# Patient Record
Sex: FEMALE | Race: WHITE | Employment: OTHER | URBAN - METROPOLITAN AREA
[De-identification: names, ages, dates, MRNs, and addresses within clinical notes are randomized per-mention and may not be internally consistent; named-entity substitution may affect disease eponyms.]

---

## 2017-01-04 ENCOUNTER — OFFICE VISIT (OUTPATIENT)
Dept: FAMILY MEDICINE CLINIC | Age: 77
End: 2017-01-04

## 2017-01-04 VITALS
BODY MASS INDEX: 24.16 KG/M2 | HEART RATE: 79 BPM | SYSTOLIC BLOOD PRESSURE: 136 MMHG | TEMPERATURE: 97.8 F | DIASTOLIC BLOOD PRESSURE: 77 MMHG | HEIGHT: 65 IN | RESPIRATION RATE: 18 BRPM | WEIGHT: 145 LBS | OXYGEN SATURATION: 95 %

## 2017-01-04 DIAGNOSIS — F41.9 ANXIETY: Primary | Chronic | ICD-10-CM

## 2017-01-04 RX ORDER — MELATONIN
DAILY
COMMUNITY

## 2017-01-04 RX ORDER — DIAZEPAM 5 MG/1
TABLET ORAL
Qty: 60 TAB | Refills: 2 | Status: SHIPPED | OUTPATIENT
Start: 2017-01-04 | End: 2017-03-27 | Stop reason: SDUPTHER

## 2017-01-04 NOTE — PROGRESS NOTES
Plans to see card later this month for overdue routine f/u. Nurse history read and confirmed by patient. [address] printout reviewed. Visit Vitals    /77    Pulse 79    Temp 97.8 °F (36.6 °C)    Resp 18    Ht 5' 5\" (1.651 m)    Wt 145 lb (65.8 kg)    SpO2 95%    BMI 24.13 kg/m2       Patient alert and cooperative. Doing well on current med at current doses, no need to increase or decrease amount. Assessment:  1. Chronic anxiety, on chronic low dose Valium. Plan:  1. Refilled at three months. Return at that time for nurse visit, annual labs, and visit due in August.  2. Follow otherwise here prn.

## 2017-01-04 NOTE — MR AVS SNAPSHOT
Visit Information Date & Time Provider Department Dept. Phone Encounter #  
 1/4/2017  3:30 PM Andra Reagan MD [facility] [phone] [id number] Follow-up Instructions Return in about 3 months (around 4/4/2017) for NV refill. Upcoming Health Maintenance Date Due  
 GLAUCOMA SCREENING Q2Y 1/6/2017* DTaP/Tdap/Td series (1 - Tdap) 1/6/2017* MEDICARE YEARLY EXAM 1/20/2017 BREAST CANCER SCRN MAMMOGRAM 6/29/2017 Pneumococcal 65+ Low/Medium Risk (2 of 2 - PPSV23) 7/6/2017 COLONOSCOPY 11/21/2018 *Topic was postponed. The date shown is not the original due date. Allergies as of 1/4/2017  Review Complete On: 1/4/2017 By: Leni House LPN Severity Noted Reaction Type Reactions Ciprofloxacin  01/20/2016   Side Effect Nausea Only Current Immunizations  Reviewed on 7/9/2014 No immunizations on file. Not reviewed this visit You Were Diagnosed With   
  
 Codes Comments Anxiety    -  Primary ICD-10-CM: F41.9 ICD-9-CM: 300.00 Vitals BP Pulse Temp Resp Height(growth percentile) Weight(growth percentile) 136/77 79 97.8 °F (36.6 °C) 18 5' 5\" (1.651 m) 145 lb (65.8 kg) SpO2 BMI OB Status Smoking Status 95% 24.13 kg/m2 Having regular periods Former Smoker BMI and BSA Data Body Mass Index Body Surface Area  
 24.13 kg/m 2 1.74 m 2 Preferred Pharmacy Pharmacy Name Phone [facility] [address] AT [address] [place] [address] Drive [phone] Your Updated Medication List  
  
   
This list is accurate as of: 1/4/17  4:05 PM.  Always use your most recent med list.  
  
  
  
  
 aspirin delayed-release 81 mg tablet Take 81 mg by mouth daily. diazePAM 5 mg tablet Commonly known as:  VALIUM Take full tablet in the am and 1/2 as needed mid day and at bedtime for anxiety  Indications: ANXIETY  
  
 naproxen sodium 220 mg tablet Commonly known as:  NAPROSYN Take 220 mg by mouth daily as needed. simvastatin 20 mg tablet Commonly known as:  ZOCOR Take 1 Tab by mouth nightly. Indications: HYPERCHOLESTEROLEMIA  
  
 VITAMIN D3 1,000 unit tablet Generic drug:  cholecalciferol Take  by mouth daily. Prescriptions Printed Refills  
 diazePAM (VALIUM) 5 mg tablet 2 Sig: Take full tablet in the am and 1/2 as needed mid day and at bedtime for anxiety  Indications: ANXIETY Class: Print Follow-up Instructions Return in about 3 months (around 4/4/2017) for NV refill. Introducing [facility]! Dear Patricia Lockhart: Thank you for requesting a Pango account. Our records indicate that you already have an active Pango account. You can access your account anytime at [email or web address]. [email or web address] Did you know that you can access your hospital and ER discharge instructions at any time in Pango? You can also review all of your test results from your hospital stay or ER visit. Additional Information If you have questions, please visit the Frequently Asked Questions section of the Pango website at [email or web address] Remember, Pango is NOT to be used for urgent needs. For medical emergencies, dial 911. Now available from your iPhone and Android! Please provide this summary of care documentation to your next provider. Your primary care clinician is listed as [phone] If you have any questions after today's visit, please call [phone].

## 2017-01-04 NOTE — PROGRESS NOTES
Here for MD visit to have her Valium refilled. New control agreement done. She has not seen any other providers since last visit.   Right to Decide booklet given at last visit

## 2017-02-25 ENCOUNTER — HOSPITAL ENCOUNTER (OUTPATIENT)
Dept: LAB | Age: 77
Discharge: HOME | End: 2017-02-25

## 2017-02-25 ENCOUNTER — HOSPITAL ENCOUNTER (EMERGENCY)
Age: 77
Discharge: HOME | End: 2017-02-25
Attending: EMERGENCY MEDICINE

## 2017-02-25 VITALS
DIASTOLIC BLOOD PRESSURE: 81 MMHG | OXYGEN SATURATION: 95 % | WEIGHT: 144.7 LBS | HEART RATE: 86 BPM | RESPIRATION RATE: 16 BRPM | TEMPERATURE: 97.5 F | BODY MASS INDEX: 24.11 KG/M2 | SYSTOLIC BLOOD PRESSURE: 173 MMHG | HEIGHT: 65 IN

## 2017-02-25 DIAGNOSIS — B34.9 VIRAL ILLNESS: Primary | ICD-10-CM

## 2017-02-25 LAB
BILIRUB UR QL: NEGATIVE
GLUCOSE UR QL STRIP.AUTO: NEGATIVE MG/DL
KETONES UR-MCNC: NEGATIVE MG/DL
LEUKOCYTE ESTERASE UR QL STRIP: NEGATIVE
NITRITE UR QL: NEGATIVE
PH UR: 7 [PH] (ref 5–8)
PROT UR QL: NEGATIVE MG/DL
RBC # UR STRIP: NEGATIVE /UL
SP GR UR: 1.01 (ref 1–1.03)
UROBILINOGEN UR QL: 0.2 EU/DL (ref 0.2–1)

## 2017-02-25 PROCEDURE — 87086 URINE CULTURE/COLONY COUNT: CPT | Performed by: EMERGENCY MEDICINE

## 2017-02-25 NOTE — DISCHARGE INSTRUCTIONS

## 2017-02-25 NOTE — UC PROVIDER NOTE
Patient is a 76 y.o. female presenting with general illness. The history is provided by the patient. Generalized Body Aches   This is a new problem. The current episode started more than 2 days ago. The problem occurs constantly. The problem has not changed since onset. Pertinent negatives include no chest pain, no abdominal pain, no headaches and no shortness of breath. Nothing aggravates the symptoms. Nothing relieves the symptoms. She has tried acetaminophen and aspirin for the symptoms. The treatment provided no relief. Past Medical History:   Diagnosis Date    Advance directive discussed with patient 03/23/2016    Dysuria 5/9/16    [facility]  US report rec'd     Encounter for gynecological examination 5/19/16    notes from [address]    Hearing loss in left ear     Murmur, cardiac     echocardiogram report from Dr José Antonio Paul  EF 65-70%  1/23/17 note and ekg    Myalgia     Psychiatric disorder     anxiety    Pure hypercholesterolemia 11/14/2009    Unexplained weight loss 6/6/16    GI spec Dr Anthony Tavarez note rec'd        Past Surgical History:   Procedure Laterality Date    ENDOSCOPY, COLON, DIAGNOSTIC  2/2005    ad polyp; 3 year repeat ; [facility] ENDOSCOPY, COLON, DIAGNOSTIC  3/2008    neg; 5 year repeat; [facility] HX GYN      tubes tied    [place] COLONOSCOPY FLX DX W/COLLJ SPEC WHEN PFRMD  11/21/2013; 2008; 2003              Family History   Problem Relation Age of Onset    Dementia Mother     Other Mother      Vitamin B12 deficiency/Pernicious anemia    Congenital heart defect Father     Cancer Brother      Prostate CA    Stroke Brother     Cancer Daughter      Non-Hodgkin's lymphoma        Social History     Social History    Marital status:      Spouse name: N/A    Number of children: N/A    Years of education: N/A     Occupational History    Not on file.      Social History Main Topics    Smoking status: Former Smoker     Packs/day: 0.50     Years: 20.00     Types: Cigarettes     Quit date: 12/4/1973    Smokeless tobacco: Never Used    Alcohol use Yes      Comment: very little (2-3x/month)    Drug use: No    Sexual activity: Yes     Partners: Male     Other Topics Concern    Not on file     Social History Narrative                ALLERGIES: Ciprofloxacin    Review of Systems   Constitutional: Positive for fatigue. Negative for chills and fever. HENT: Negative for facial swelling, mouth sores, postnasal drip, rhinorrhea and trouble swallowing. Eyes: Negative. Respiratory: Negative for cough, chest tightness and shortness of breath. Cardiovascular: Negative for chest pain. Gastrointestinal: Positive for nausea. Negative for abdominal pain and diarrhea. Endocrine: Negative. Genitourinary: Negative. Musculoskeletal: Positive for myalgias. Negative for back pain and neck pain. Skin: Negative. Neurological: Negative for headaches. All other systems reviewed and are negative. Vitals:    02/25/17 1519   BP: 173/81   Pulse: 86   Resp: 16   Temp: 97.5 °F (36.4 °C)   SpO2: 95%   Weight: 65.6 kg (144 lb 11.2 oz)   Height: 5' 5\" (1.651 m)       Physical Exam   Constitutional: She is oriented to person, place, and time. She appears well-developed and well-nourished. HENT:   Head: Normocephalic and atraumatic. Mouth/Throat: Oropharynx is clear and moist. No oropharyngeal exudate. Eyes: Conjunctivae and EOM are normal. Pupils are equal, round, and reactive to light. Right eye exhibits no discharge. Left eye exhibits no discharge. No scleral icterus. Neck: Normal range of motion. No tracheal deviation present. No thyromegaly present. Cardiovascular: Normal rate, regular rhythm and normal heart sounds. No murmur heard. Pulmonary/Chest: Effort normal and breath sounds normal. No respiratory distress. She has no wheezes. She has no rales. She exhibits no tenderness. Abdominal: Soft. Bowel sounds are normal. She exhibits no distension.  There is no tenderness. There is no rebound and no guarding. Musculoskeletal: Normal range of motion. She exhibits no edema or tenderness. Lymphadenopathy:     She has no cervical adenopathy. Neurological: She is alert and oriented to person, place, and time. No cranial nerve deficit. Coordination normal.   Skin: Skin is warm. No erythema. Psychiatric: She has a normal mood and affect. Her behavior is normal. Judgment and thought content normal.   Nursing note and vitals reviewed. MDM     Differential Diagnosis; Clinical Impression; Plan:      Body aches, fatigue, likely viral syndrome, will send urine culture, given patient's prior history of uti's, follow up pcp      Procedures

## 2017-02-27 ENCOUNTER — PATIENT OUTREACH (OUTPATIENT)
Dept: FAMILY MEDICINE CLINIC | Age: 77
End: 2017-02-27

## 2017-02-27 LAB
BACTERIA SPEC CULT: NORMAL
CC UR VC: NORMAL
SERVICE CMNT-IMP: NORMAL

## 2017-02-27 NOTE — PROGRESS NOTES
NNTOCED    Pt listed on 17 Hospital Discharge Report [facility]). Seen @ [facility]. Diagnosis:  Viral Syndrome    Pt hx of UTIs. POC URINE MACROSCOPIC - negative  - Urine Culture done-  [place] Count <10,000   COLONIES/mL        Final   Culture result: NO SIGNIFICANT GROWTH           Discharge Plan/Instructions:  - disposition-home  -no Rxs  - PCP f/u if sxs worsen    12:22 PM- pt outreach (call). Pt ID verified with 2 identifiers, name and . NN introduction. Reason for call stated. Pt reported \"feeling a little better\"  - Felt sickly. Wanted to r/o UTI. It was r/o  - No fever. Still a little achy & weak. Drinking plenty of fluids  - PCP f/u offered. Pt declined. \"It's viral & has to run it's course\"  - NN contact # given. Pt instructed to call if not improved within next 2-3- days.  Pt verbalized understanding  -

## 2017-03-27 ENCOUNTER — OFFICE VISIT (OUTPATIENT)
Dept: FAMILY MEDICINE CLINIC | Age: 77
End: 2017-03-27

## 2017-03-27 VITALS
HEART RATE: 71 BPM | HEIGHT: 65 IN | OXYGEN SATURATION: 93 % | BODY MASS INDEX: 24.71 KG/M2 | TEMPERATURE: 96.1 F | WEIGHT: 148.3 LBS | RESPIRATION RATE: 16 BRPM | SYSTOLIC BLOOD PRESSURE: 131 MMHG | DIASTOLIC BLOOD PRESSURE: 70 MMHG

## 2017-03-27 DIAGNOSIS — F41.9 ANXIETY: Chronic | ICD-10-CM

## 2017-03-27 RX ORDER — LANOLIN ALCOHOL/MO/W.PET/CERES
400 CREAM (GRAM) TOPICAL DAILY
COMMUNITY
End: 2022-08-19

## 2017-03-27 RX ORDER — DIAZEPAM 5 MG/1
TABLET ORAL
Qty: 60 TAB | Refills: 2 | Status: SHIPPED | OUTPATIENT
Start: 2017-03-27 | End: 2017-05-23 | Stop reason: SDUPTHER

## 2017-03-27 RX ORDER — MAG HYDROX/ALUMINUM HYD/SIMETH 200-200-20
SUSPENSION, ORAL (FINAL DOSE FORM) ORAL AS NEEDED
COMMUNITY
End: 2017-05-23 | Stop reason: ALTCHOICE

## 2017-03-27 NOTE — PROGRESS NOTES
Chief Complaint   Patient presents with    Medication Refill     Doing well on medication. 1. Have you been to the ER, urgent care clinic since your last visit? Hospitalized since your last visit? Yes When: 2/25/17 Where: [facility] Reason for visit: Michael Pérez. UTI    2. Have you seen or consulted any other health care providers outside of the [address] [place] [address] since your last visit? Include any pap smears or colon screening. Yes When: 1/17 Where: Dr. Meryle Kea Reason for visit: EKG    I have reviewed Health Maintenance with the patient and updated. Advance Care Planning information reviewed and given to the patient at a previous visit.

## 2017-03-27 NOTE — MR AVS SNAPSHOT
Visit Information Date & Time Provider Department Dept. Phone Encounter #  
 3/27/2017  2:45 PM Sally Gomes MD [facility] [phone] [id number] Follow-up Instructions Return in about 3 months (around 6/27/2017) for Med refill. Upcoming Health Maintenance Date Due  
 BREAST CANCER SCRN MAMMOGRAM 6/29/2017 GLAUCOMA SCREENING Q2Y 6/25/2017* MEDICARE YEARLY EXAM 6/25/2017* Pneumococcal 65+ Low/Medium Risk (2 of 2 - PPSV23) 7/6/2017 COLONOSCOPY 11/21/2018 DTaP/Tdap/Td series (2 - Td) 3/27/2027 *Topic was postponed. The date shown is not the original due date. Allergies as of 3/27/2017  Review Complete On: 3/27/2017 By: Lamont Galvan RN Severity Noted Reaction Type Reactions Ciprofloxacin  01/20/2016   Side Effect Nausea Only Current Immunizations  Reviewed on 7/9/2014 No immunizations on file. Not reviewed this visit You Were Diagnosed With   
  
 Codes Comments Anxiety     ICD-10-CM: F41.9 ICD-9-CM: 300.00 Vitals BP Pulse Temp Resp Height(growth percentile) Weight(growth percentile) 131/70 (BP 1 Location: Left arm, BP Patient Position: Sitting) 71 96.1 °F (35.6 °C) (Oral) 16 5' 5\" (1.651 m) 148 lb 4.8 oz (67.3 kg) SpO2 BMI OB Status Smoking Status 93% 24.68 kg/m2 Postmenopausal Former Smoker Vitals History BMI and BSA Data Body Mass Index Body Surface Area  
 24.68 kg/m 2 1.76 m 2 Preferred Pharmacy Pharmacy Name Phone [facility], [address] [place] [address] AT [address] [place] [address] Drive [phone] Your Updated Medication List  
  
   
This list is accurate as of: 3/27/17  3:17 PM.  Always use your most recent med list.  
  
  
  
  
 aspirin delayed-release 81 mg tablet Take 81 mg by mouth daily. diazePAM 5 mg tablet Commonly known as:  VALIUM  
 Take full tablet in the am and 1/2 as needed mid day and at bedtime for anxiety  Indications: ANXIETY  
  
 hydrocortisone 1 % ointment Commonly known as:  HYCORT Apply  to affected area as needed for Skin Irritation. use thin layer  
  
 magnesium oxide 400 mg tablet Commonly known as:  MAG-OX Take 400 mg by mouth daily. naproxen sodium 220 mg tablet Commonly known as:  NAPROSYN Take 220 mg by mouth daily as needed. simvastatin 20 mg tablet Commonly known as:  ZOCOR Take 1 Tab by mouth nightly. Indications: HYPERCHOLESTEROLEMIA  
  
 VITAMIN D3 1,000 unit tablet Generic drug:  cholecalciferol Take  by mouth daily. Prescriptions Printed Refills  
 diazePAM (VALIUM) 5 mg tablet 2 Sig: Take full tablet in the am and 1/2 as needed mid day and at bedtime for anxiety  Indications: ANXIETY Class: Print Follow-up Instructions Return in about 3 months (around 6/27/2017) for Med refill. To-Do List   
 05/23/2017 8:15 AM  
  Appointment with Ken Zepeda MD at [place] [address] ([phone]) [facility]! Dear Delmi Pruett: Thank you for requesting a Coppertino account. Our records indicate that you already have an active Coppertino account. You can access your account anytime at [email or web address]. [employer]/WiQuest Communications Did you know that you can access your hospital and ER discharge instructions at any time in Coppertino? You can also review all of your test results from your hospital stay or ER visit. Additional Information If you have questions, please visit the Frequently Asked Questions section of the Coppertino website at [email or web address]/. Remember, Coppertino is NOT to be used for urgent needs. For medical emergencies, dial 911. Now available from your iPhone and Android! Please provide this summary of care documentation to your next provider. Your primary care clinician is listed as [phone] If you have any questions after today's visit, please call [phone].

## 2017-05-23 ENCOUNTER — OFFICE VISIT (OUTPATIENT)
Dept: FAMILY MEDICINE CLINIC | Age: 77
End: 2017-05-23

## 2017-05-23 VITALS
BODY MASS INDEX: 26.33 KG/M2 | DIASTOLIC BLOOD PRESSURE: 86 MMHG | HEIGHT: 63 IN | RESPIRATION RATE: 16 BRPM | OXYGEN SATURATION: 94 % | HEART RATE: 73 BPM | SYSTOLIC BLOOD PRESSURE: 158 MMHG | TEMPERATURE: 97.2 F | WEIGHT: 148.6 LBS

## 2017-05-23 DIAGNOSIS — R03.0 ELEVATED BLOOD PRESSURE READING WITHOUT DIAGNOSIS OF HYPERTENSION: ICD-10-CM

## 2017-05-23 DIAGNOSIS — Z00.00 MEDICARE ANNUAL WELLNESS VISIT, SUBSEQUENT: Primary | ICD-10-CM

## 2017-05-23 DIAGNOSIS — Z13.39 SCREENING FOR ALCOHOLISM: ICD-10-CM

## 2017-05-23 DIAGNOSIS — F41.9 ANXIETY: Chronic | ICD-10-CM

## 2017-05-23 RX ORDER — DIAZEPAM 5 MG/1
TABLET ORAL
Qty: 60 TAB | Refills: 2 | Status: SHIPPED | OUTPATIENT
Start: 2017-05-23 | End: 2017-09-20 | Stop reason: SDUPTHER

## 2017-05-23 NOTE — ACP (ADVANCE CARE PLANNING)
Advance Care Planning    Advance Care Planning (ACP) Provider Conversation Snapshot    Date of ACP Conversation: 05/23/17  Persons included in Conversation:  patient  Length of ACP Conversation in minutes:  <16 minutes (Non-Billable)    Authorized Decision Maker (if patient is incapable of making informed decisions): This person is:    Other Legally Authorized Decision Maker (e.g. Next of Kin)          For Patients with Decision Making Capacity:   Values/Goals: Exploration of values, goals, and preferences if recovery is not expected, even with continued medical treatment in the event of:  Imminent death  Severe, permanent brain injury    Conversation Outcomes / Follow-Up Plan:   Recommended completion of Advance Directive form after review of ACP materials and conversation with prospective healthcare agent

## 2017-05-23 NOTE — PROGRESS NOTES
HISTORY OF PRESENT ILLNESS  Ulysses Quinones is a 76 y.o. female. HPI   Here for [facility]. Review of Systems   Constitutional: Negative. HENT: Negative. Eyes: Negative. Respiratory: Negative. Cardiovascular: Negative. Gastrointestinal: Negative. Genitourinary: Negative. Musculoskeletal: Negative. Skin: Negative. Neurological: Negative. Endo/Heme/Allergies: Negative. Psychiatric/Behavioral: Negative. Physical Exam   NA    ASSESSMENT and PLAN  See below     This is a Subsequent Medicare Annual Wellness Visit providing Personalized Prevention Plan Services (PPPS) (Performed 12 months after initial AWV and PPPS )    I have reviewed the patient's medical history in detail and updated the computerized patient record. Eye doctor past yr. Dentist 4 x annually. Colonoscopy '13. Supposedly due 5 yr repeat for h/o polyps. Mammo past yr. DEXA 5 yrs ago. No signif hx past yr. [address] [place] [employer] printout reviewed. History     Past Medical History:   Diagnosis Date    Advance directive discussed with patient 03/23/2016    Dysuria 5/9/16    [facility]  US report rec'd     Encounter for gynecological examination 5/19/16    notes from [address] [place] [address] Rd Hearing loss in left ear     Murmur, cardiac     echocardiogram report from Dr Shena Linton  EF 65-70%  1/23/17 note and ekg    Myalgia     Psychiatric disorder     anxiety    Pure hypercholesterolemia 11/14/2009    Unexplained weight loss 6/6/16    GI spec Dr Petersen Hidden note rec'd      Past Surgical History:   Procedure Laterality Date    ENDOSCOPY, COLON, DIAGNOSTIC  2/2005    ad polyp; 3 year repeat ; [facility] ENDOSCOPY, COLON, DIAGNOSTIC  3/2008    neg; 5 year repeat; [facility] HX G[facility]      tubes tied    [place] COLONOSCOPY FLX DX W/COLLJ SPEC WHEN PFRMD  11/21/2013; 2008; 2003          Current Outpatient Prescriptions   Medication Sig Dispense Refill    magnesium oxide (MAG-OX) 400 mg tablet Take 400 mg by mouth daily.       diazePAM (VALIUM) 5 mg tablet Take full tablet in the am and 1/2 as needed mid day and at bedtime for anxiety  Indications: ANXIETY 60 Tab 2    cholecalciferol (VITAMIN D3) 1,000 unit tablet Take  by mouth daily.  aspirin delayed-release 81 mg tablet Take 81 mg by mouth daily.  naproxen sodium (NAPROSYN) 220 mg tablet Take 220 mg by mouth daily as needed. Allergies   Allergen Reactions    Ciprofloxacin Nausea Only     Family History   Problem Relation Age of Onset    Dementia Mother     Other Mother      Vitamin B12 deficiency/Pernicious anemia    Congenital heart defect Father     Cancer Brother      Prostate CA    Stroke Brother     Cancer Daughter      Non-Hodgkin's lymphoma     Social History   Substance Use Topics    Smoking status: Former Smoker     Packs/day: 0.50     Years: 20.00     Types: Cigarettes     Quit date: 12/4/1973    Smokeless tobacco: Never Used    Alcohol use Yes      Comment: very little (2-3x/month)     Patient Active Problem List   Diagnosis Code    Pure hypercholesterolemia E78.00    Anxiety F41.9    DDD (degenerative disc disease) [id number]    Hearing loss in right ear H91.91    Tinnitus, right H93.11    Cervical radiculitis M54.12    DJD (degenerative joint disease) of knee M17.10    Elevated blood pressure reading without diagnosis of hypertension R03.0    Stress due to illness of family member Z63.79    Dysuria R30.0       Depression Risk Factor Screening:     PHQ over the last two weeks 5/23/2017   Little interest or pleasure in doing things Several days   Feeling down, depressed or hopeless Not at all   Total Score PHQ 2 1     Alcohol Risk Factor Screening: On any occasion during the past 3 months, have you had more than 3 drinks containing alcohol? No    Do you average more than 7 drinks per week? No      Functional Ability and Level of Safety:     Hearing Loss   normal-to-mild    Activities of Daily Living   Self-care.    Requires assistance with: no ADLs    Fall Risk     Fall Risk Assessment, last 12 mths 5/23/2017   Able to walk? Yes   Fall in past 12 months? No     Abuse Screen   Patient is not abused    Review of Systems   See above    Physical Examination     Evaluation of Cognitive Function:  Mood/affect:  Anxiety improved  Appearance: age appropriate, casually dressed and within normal Limits  Family member/caregiver input: NA    No exam performed today, NA. Patient Care Team:  Dylan Agustin MD as PCP - General (Family Practice)  Shreya Dempsey MD as Physician (Obstetrics & Gynecology)  Mohini Devries MD (Ophthalmology)  Guillermo Loving MD as Physician (Gastroenterology)  Ines Amador DMD (Dental General Practice)  Som Veloz MD (Obstetrics & Gynecology)  Anastacia Reynolds MD (Cardiology)  Mikaela Renee RN as Ambulatory Care Navigator [facility])    Advice/Referrals/Counseling   Education and counseling provided:  Are appropriate based on today's review and evaluation  End-of-Life planning (with patient's consent)  Pneumococcal Vaccine  Screening Mammography  Colorectal cancer screening tests  Cardiovascular screening blood test  Bone mass measurement (DEXA)  Screening for glaucoma  Diabetes screening test      Assessment/Plan       ICD-10-CM ICD-9-CM    1. Medicare annual wellness visit, subsequent Z00.00 V70.0    2. Anxiety F41.9 300.00 diazePAM (VALIUM) 5 mg tablet   3. Elevated blood pressure reading without diagnosis of hypertension R03.0 796.2    4. Screening for alcoholism Z13.89 V79.1      Follow-up Disposition:  Return in about 1 year (around 5/23/2018) for [address] Lorenso Frankel

## 2017-05-23 NOTE — PROGRESS NOTES
Chief Complaint   Patient presents with    Annual Wellness Visit    Labs     Fasting   BP at home 135-140/80's. Patient brought Valium bottle with her to this visit. She has 17 pills left and one additional refill. 1. Have you been to the ER, urgent care clinic since your last visit? Hospitalized since your last visit? No    2. Have you seen or consulted any other health care providers outside of the [address] [place] [address] since your last visit? Include any pap smears or colon screening. No       I have reviewed Health Maintenance with the patient and updated. Advance Care Planning information reviewed and given to the patient at a previous visit. Complete Physical Exam Female  Pre-Visit Questions:    1. Do you follow a low fat or low salt diet ? n  2. Do you follow an exercise program? y  3. Have you had your tetanus booster in the last 10 years? y  3. Have you ever had a Pneumonia vaccine? n  5. Do you smoke? n  6. Do you consider yourself overweight? n  7. Do you perform Breast self exam?y  8. Is there a family history of CAD< age 48? n  5. Is there a family history of Cancer? n  10. Do you have any Cancer risks? n  11. Have you had a colonoscopy? y  15. Have you been to your eye doctor past year?   y  15. Have you been to your dentist in the last 6 months?  y  15. Have you had your flu shot for this season?  n  13. Have you had a Pap smear in the last 3 years?n  16. Have you had your annual mammogram?y  17.   Have you had a bone density scan(DEXA)?y

## 2017-05-23 NOTE — MR AVS SNAPSHOT
Visit Information Date & Time Provider Department Dept. Phone Encounter #  
 5/23/2017  8:15 AM Krista Hannah MD [facility] [phone] [id number] Follow-up Instructions Return in about 1 year (around 5/23/2018) for Erica Lopez Upcoming Health Maintenance Date Due  
 GLAUCOMA SCREENING Q2Y 6/25/2017* MEDICARE YEARLY EXAM 6/25/2017* BREAST CANCER SCRN MAMMOGRAM 7/23/2017* INFLUENZA AGE 9 TO ADULT 8/1/2017 COLONOSCOPY 11/21/2018 DTaP/Tdap/Td series (2 - Td) 3/27/2027 *Topic was postponed. The date shown is not the original due date. Allergies as of 5/23/2017  Review Complete On: 5/23/2017 By: Krista Hannah MD  
  
 Severity Noted Reaction Type Reactions Ciprofloxacin  01/20/2016   Side Effect Nausea Only Current Immunizations  Reviewed on 7/9/2014 No immunizations on file. Not reviewed this visit You Were Diagnosed With   
  
 Codes Comments Medicare annual wellness visit, subsequent    -  Primary ICD-10-CM: Z00.00 ICD-9-CM: V70.0 Anxiety     ICD-10-CM: F41.9 ICD-9-CM: 300.00 Elevated blood pressure reading without diagnosis of hypertension     ICD-10-CM: R03.0 ICD-9-CM: 796.2 Screening for alcoholism     ICD-10-CM: Z13.89 ICD-9-CM: V79.1 Vitals BP Pulse Temp Resp Height(growth percentile) Weight(growth percentile) 158/86 (BP 1 Location: Left arm, BP Patient Position: Sitting) 73 97.2 °F (36.2 °C) (Oral) 16 5' 2.5\" (1.588 m) 148 lb 9.6 oz (67.4 kg) SpO2 BMI OB Status Smoking Status 94% 26.75 kg/m2 Postmenopausal Former Smoker Vitals History BMI and BSA Data Body Mass Index Body Surface Area  
 26.75 kg/m 2 1.72 m 2 Preferred Pharmacy Pharmacy Name Phone [facility], [facility] [facility] [phone] Your Updated Medication List  
  
   
 This list is accurate as of: 5/23/17  9:12 AM.  Always use your most recent med list.  
  
  
  
  
 aspirin delayed-release 81 mg tablet Take 81 mg by mouth daily. diazePAM 5 mg tablet Commonly known as:  VALIUM Take full tablet in the am and 1/2 as needed mid day and at bedtime for anxiety  Indications: anxiety  
  
 magnesium oxide 400 mg tablet Commonly known as:  MAG-OX Take 400 mg by mouth daily. naproxen sodium 220 mg tablet Commonly known as:  NAPROSYN Take 220 mg by mouth daily as needed. VITAMIN D3 1,000 unit tablet Generic drug:  cholecalciferol Take  by mouth daily. Prescriptions Printed Refills  
 diazePAM (VALIUM) 5 mg tablet 2 Sig: Take full tablet in the am and 1/2 as needed mid day and at bedtime for anxiety  Indications: anxiety Class: Print Follow-up Instructions Return in about 1 year (around 5/23/2018) for [facility]! Dear Shandra Records: Thank you for requesting a [employer] account. Our records indicate that you already have an active [employer] account. You can access your account anytime at [email or web address] Did you know that you can access your hospital and ER discharge instructions at any time in [employer]? You can also review all of your test results from your hospital stay or ER visit. Additional Information If you have questions, please visit the Frequently Asked Questions section of the [employer] website at [email or web address] Remember, [employer] is NOT to be used for urgent needs. For medical emergencies, dial 911. Now available from your iPhone and Android! Please provide this summary of care documentation to your next provider. Your primary care clinician is listed as [id number] If you have any questions after today's visit, please call [phone].

## 2017-06-21 ENCOUNTER — HOSPITAL ENCOUNTER (OUTPATIENT)
Dept: MAMMOGRAPHY | Age: 77
Discharge: HOME | End: 2017-06-21
Attending: OBSTETRICS & GYNECOLOGY
Payer: MEDICARE

## 2017-06-21 DIAGNOSIS — Z12.31 VISIT FOR SCREENING MAMMOGRAM: ICD-10-CM

## 2017-06-21 PROCEDURE — 77067 SCR MAMMO BI INCL CAD: CPT

## 2017-08-02 ENCOUNTER — PATIENT OUTREACH (OUTPATIENT)
Dept: INTERNAL MEDICINE CLINIC | Age: 77
End: 2017-08-02

## 2017-08-07 ENCOUNTER — LAB ONLY (OUTPATIENT)
Dept: FAMILY MEDICINE CLINIC | Age: 77
End: 2017-08-07

## 2017-08-07 DIAGNOSIS — E78.00 PURE HYPERCHOLESTEROLEMIA: Primary | Chronic | ICD-10-CM

## 2017-08-07 DIAGNOSIS — Z00.00 LABORATORY EXAM ORDERED AS PART OF ROUTINE GENERAL MEDICAL EXAMINATION: ICD-10-CM

## 2017-08-07 NOTE — LETTER
8/8/2017 2:11 PM 
 
Ms. Janette [address]denjuju [id number] Dear Janette Venegas: 
 
Please find your most recent results below. Resulted Orders CBC WITH AUTOMATED DIFF Result Value Ref Range WBC 5.7 3.4 - 10.8 x10E3/uL  
 RBC 4.64 3.77 - 5.28 x10E6/uL HGB 14.1 11.1 - 15.9 g/dL HCT 40.7 34.0 - 46.6 % MCV 88 79 - 97 fL  
 MCH 30.4 26.6 - 33.0 pg  
 MCHC 34.6 31.5 - 35.7 g/dL  
 RDW 14.0 12.3 - 15.4 % PLATELET 384 963 - 768 x10E3/uL NEUTROPHILS 60 % Lymphocytes 27 % MONOCYTES 10 % EOSINOPHILS 2 % BASOPHILS 1 %  
 ABS. NEUTROPHILS 3.4 1.4 - 7.0 x10E3/uL Abs Lymphocytes 1.6 0.7 - 3.1 x10E3/uL  
 ABS. MONOCYTES 0.6 0.1 - 0.9 x10E3/uL  
 ABS. EOSINOPHILS 0.1 0.0 - 0.4 x10E3/uL  
 ABS. BASOPHILS 0.0 0.0 - 0.2 x10E3/uL IMMATURE GRANULOCYTES 0 %  
 ABS. IMM. GRANS. 0.0 0.0 - 0.1 x10E3/uL Narrative Performed at:  [address]  [id number] : Paulo Rodriguez MD, Phone:  [phone] URINALYSIS W/ RFLX MICROSCOPIC Result Value Ref Range Specific Gravity 1.008 1.005 - 1.030  
 pH (UA) 7.0 5.0 - 7.5 Color Yellow Yellow Appearance Clear Clear Leukocyte Esterase Trace (A) Negative Protein Negative Negative/Trace Glucose Negative Negative Ketone Negative Negative Blood Negative Negative Bilirubin Negative Negative Urobilinogen 0.2 0.2 - 1.0 mg/dL Nitrites Negative Negative Microscopic Examination See additional order Comment:  
   Microscopic was indicated and was performed. Narrative Performed at:  [address]  [id number] : Paulo Rodriguez MD, Phone:  [phone] TSH 3RD GENERATION Result Value Ref Range TSH 3.250 0.450 - 4.500 uIU/mL Narrative Performed at:  [address]  [id number] : Bette Ascencio MD, Phone:  [phone] METABOLIC PANEL, COMPREHENSIVE Result Value Ref Range Glucose 98 65 - 99 mg/dL BUN 9 8 - 27 mg/dL Creatinine 0.67 0.57 - 1.00 mg/dL GFR est non-AA 84 >59 mL/min/1.73 GFR est AA 97 >59 mL/min/1.73  
 BUN/Creatinine ratio 13 12 - 28 Sodium 140 134 - 144 mmol/L Potassium 4.2 3.5 - 5.2 mmol/L Chloride 98 96 - 106 mmol/L  
 CO2 26 18 - 29 mmol/L Calcium 9.7 8.7 - 10.3 mg/dL Protein, total 6.6 6.0 - 8.5 g/dL Albumin 4.6 3.5 - 4.8 g/dL GLOBULIN, TOTAL 2.0 1.5 - 4.5 g/dL A-G Ratio 2.3 (H) 1.2 - 2.2 Bilirubin, total 0.4 0.0 - 1.2 mg/dL Alk. phosphatase 70 39 - 117 IU/L  
 AST (SGOT) 14 0 - 40 IU/L  
 ALT (SGPT) 9 0 - 32 IU/L Narrative Performed at:  [address]  [id number] : Bette Ascencio MD, Phone:  [phone] LIPID PANEL Result Value Ref Range Cholesterol, total 263 (H) 100 - 199 mg/dL Triglyceride 115 0 - 149 mg/dL HDL Cholesterol 61 >39 mg/dL VLDL, calculated 23 5 - 40 mg/dL LDL, calculated 179 (H) 0 - 99 mg/dL Narrative Performed at:  [address]  [id number] : Bette Ascencio MD, Phone:  [phone] MICROSCOPIC EXAMINATION Result Value Ref Range WBC 0-5 0 - 5 /hpf  
 RBC None seen 0 - 2 /hpf Epithelial cells None seen 0 - 10 /hpf Casts None seen None seen /lpf Mucus Present Not Estab. Bacteria None seen None seen/Few Narrative Performed at:  [address]  [id number] : Bette Ascencio MD, Phone:  [phone] CVD REPORT Result Value Ref Range INTERPRETATION Note Comment:  
   Supplement report is available. Narrative Performed at:  [address] [place] [address]  [id number] : Harriet Alpers PhD, Phone:  [phone]

## 2017-08-08 LAB
ALBUMIN SERPL-MCNC: 4.6 G/DL (ref 3.5–4.8)
ALBUMIN/GLOB SERPL: 2.3 {RATIO} (ref 1.2–2.2)
ALP SERPL-CCNC: 70 IU/L (ref 39–117)
ALT SERPL-CCNC: 9 IU/L (ref 0–32)
APPEARANCE UR: CLEAR
AST SERPL-CCNC: 14 IU/L (ref 0–40)
BACTERIA #/AREA URNS HPF: NORMAL /[HPF]
BASOPHILS # BLD AUTO: 0 X10E3/UL (ref 0–0.2)
BASOPHILS NFR BLD AUTO: 1 %
BILIRUB SERPL-MCNC: 0.4 MG/DL (ref 0–1.2)
BILIRUB UR QL STRIP: NEGATIVE
BUN SERPL-MCNC: 9 MG/DL (ref 8–27)
BUN/CREAT SERPL: 13 (ref 12–28)
CALCIUM SERPL-MCNC: 9.7 MG/DL (ref 8.7–10.3)
CASTS URNS QL MICRO: NORMAL /LPF
CHLORIDE SERPL-SCNC: 98 MMOL/L (ref 96–106)
CHOLEST SERPL-MCNC: 263 MG/DL (ref 100–199)
CO2 SERPL-SCNC: 26 MMOL/L (ref 18–29)
COLOR UR: YELLOW
CREAT SERPL-MCNC: 0.67 MG/DL (ref 0.57–1)
EOSINOPHIL # BLD AUTO: 0.1 X10E3/UL (ref 0–0.4)
EOSINOPHIL NFR BLD AUTO: 2 %
EPI CELLS #/AREA URNS HPF: NORMAL /HPF
ERYTHROCYTE [DISTWIDTH] IN BLOOD BY AUTOMATED COUNT: 14 % (ref 12.3–15.4)
GLOBULIN SER CALC-MCNC: 2 G/DL (ref 1.5–4.5)
GLUCOSE SERPL-MCNC: 98 MG/DL (ref 65–99)
GLUCOSE UR QL: NEGATIVE
HCT VFR BLD AUTO: 40.7 % (ref 34–46.6)
HDLC SERPL-MCNC: 61 MG/DL
HGB BLD-MCNC: 14.1 G/DL (ref 11.1–15.9)
HGB UR QL STRIP: NEGATIVE
IMM GRANULOCYTES # BLD: 0 X10E3/UL (ref 0–0.1)
IMM GRANULOCYTES NFR BLD: 0 %
INTERPRETATION, 910389: NORMAL
KETONES UR QL STRIP: NEGATIVE
LDLC SERPL CALC-MCNC: 179 MG/DL (ref 0–99)
LEUKOCYTE ESTERASE UR QL STRIP: ABNORMAL
LYMPHOCYTES # BLD AUTO: 1.6 X10E3/UL (ref 0.7–3.1)
LYMPHOCYTES NFR BLD AUTO: 27 %
MCH RBC QN AUTO: 30.4 PG (ref 26.6–33)
MCHC RBC AUTO-ENTMCNC: 34.6 G/DL (ref 31.5–35.7)
MCV RBC AUTO: 88 FL (ref 79–97)
MICRO URNS: ABNORMAL
MONOCYTES # BLD AUTO: 0.6 X10E3/UL (ref 0.1–0.9)
MONOCYTES NFR BLD AUTO: 10 %
MUCOUS THREADS URNS QL MICRO: PRESENT
NEUTROPHILS # BLD AUTO: 3.4 X10E3/UL (ref 1.4–7)
NEUTROPHILS NFR BLD AUTO: 60 %
NITRITE UR QL STRIP: NEGATIVE
PH UR STRIP: 7 [PH] (ref 5–7.5)
PLATELET # BLD AUTO: 250 X10E3/UL (ref 150–379)
POTASSIUM SERPL-SCNC: 4.2 MMOL/L (ref 3.5–5.2)
PROT SERPL-MCNC: 6.6 G/DL (ref 6–8.5)
PROT UR QL STRIP: NEGATIVE
RBC # BLD AUTO: 4.64 X10E6/UL (ref 3.77–5.28)
RBC #/AREA URNS HPF: NORMAL /HPF
SODIUM SERPL-SCNC: 140 MMOL/L (ref 134–144)
SP GR UR: 1.01 (ref 1–1.03)
TRIGL SERPL-MCNC: 115 MG/DL (ref 0–149)
TSH SERPL DL<=0.005 MIU/L-ACNC: 3.25 UIU/ML (ref 0.45–4.5)
UROBILINOGEN UR STRIP-MCNC: 0.2 MG/DL (ref 0.2–1)
VLDLC SERPL CALC-MCNC: 23 MG/DL (ref 5–40)
WBC # BLD AUTO: 5.7 X10E3/UL (ref 3.4–10.8)
WBC #/AREA URNS HPF: NORMAL /HPF

## 2017-08-09 DIAGNOSIS — E78.00 PURE HYPERCHOLESTEROLEMIA: Primary | Chronic | ICD-10-CM

## 2017-08-09 RX ORDER — ATORVASTATIN CALCIUM 10 MG/1
10 TABLET, FILM COATED ORAL DAILY
Qty: 90 TAB | Refills: 0 | Status: SHIPPED | COMMUNITY
Start: 2017-08-09 | End: 2017-10-12 | Stop reason: SDUPTHER

## 2017-09-20 ENCOUNTER — OFFICE VISIT (OUTPATIENT)
Dept: FAMILY MEDICINE CLINIC | Age: 77
End: 2017-09-20

## 2017-09-20 VITALS
HEIGHT: 63 IN | HEART RATE: 84 BPM | RESPIRATION RATE: 18 BRPM | OXYGEN SATURATION: 94 % | WEIGHT: 152.3 LBS | SYSTOLIC BLOOD PRESSURE: 136 MMHG | BODY MASS INDEX: 26.98 KG/M2 | TEMPERATURE: 97.3 F | DIASTOLIC BLOOD PRESSURE: 67 MMHG

## 2017-09-20 DIAGNOSIS — F41.9 ANXIETY: Chronic | ICD-10-CM

## 2017-09-20 RX ORDER — DIAZEPAM 5 MG/1
TABLET ORAL
Qty: 60 TAB | Refills: 2 | Status: SHIPPED | OUTPATIENT
Start: 2017-09-20 | End: 2017-12-29 | Stop reason: SDUPTHER

## 2017-09-20 NOTE — PROGRESS NOTES
Chief Complaint   Patient presents with    Medication Refill     Valium is working well with no problems. 1. Have you been to the ER, urgent care clinic since your last visit? Hospitalized since your last visit? No    2. Have you seen or consulted any other health care providers outside of the [address] [place] [address] since your last visit? Include any pap smears or colon screening. No     I have reviewed Health Maintenance with the patient and updated. Advance Care Planning information reviewed and given to the patient at a previous visit. The patient was counseled on the dangers of tobacco use, and Patient is a non smoker. Reviewed strategies to maximize success, including Continue not to smoke. [employer]  Nurse Note for Controlled Substance Refill  Chief Complaint   Patient presents with    Medication Refill     Valium is working well with no problems. Patient requests refill of: Valium    Visit Vitals    Ht 5' 2.5\" (1.588 m)    Wt 152 lb 4.8 oz (69.1 kg)    BMI 27.41 kg/m2         PILL COUNT  Today's Date: 2017  Medication name: Valium  Pill strength: 5 mg  How medication is supposed to be taken: Take 1 in the  morning and 1/2  Prn at midday and at bedtime for anxiety. How medication is being taken: Same as above  Date prescription filled: 17  Prescribing Provider: Dr. Josue Ivey. Read  # of pills prescribed: 61  # of pills remainin   # pills taking per day: 1-2 a day  Last dose of medication taken, per patient: 17  Pain scale and location of pain: 0  Counted in front of patient. Bottle with contents returned to patient. [facility]  reviewed by provider. Discussed pill count with provider. Per provider, refill medication granted. Follow up as advised. Pt was made aware of provider's decision, and to return in 3 months for an office visit, if one is not already scheduled at this time.    Controlled Substance Refill sheet signed by patient and provider. Provided with naloxone prescription, if taking benzodiazepine, prior overdose, substance abuse, or >= 120 MME per day. Continuation of treatment with controlled substance is justified by patient's functional improvements. Patient will benefit from continued prescribing.

## 2017-10-12 DIAGNOSIS — E78.00 PURE HYPERCHOLESTEROLEMIA: Chronic | ICD-10-CM

## 2017-10-12 RX ORDER — ATORVASTATIN CALCIUM 10 MG/1
TABLET, FILM COATED ORAL
Qty: 90 TAB | Refills: 2 | Status: SHIPPED | OUTPATIENT
Start: 2017-10-12 | End: 2018-05-02 | Stop reason: SDUPTHER

## 2017-11-01 ENCOUNTER — HOSPITAL ENCOUNTER (EMERGENCY)
Age: 77
Discharge: HOME | End: 2017-11-01
Attending: EMERGENCY MEDICINE
Payer: MEDICARE

## 2017-11-01 VITALS
WEIGHT: 154.1 LBS | SYSTOLIC BLOOD PRESSURE: 186 MMHG | HEIGHT: 65 IN | DIASTOLIC BLOOD PRESSURE: 90 MMHG | TEMPERATURE: 97.5 F | RESPIRATION RATE: 14 BRPM | OXYGEN SATURATION: 100 % | BODY MASS INDEX: 25.67 KG/M2 | HEART RATE: 80 BPM

## 2017-11-01 DIAGNOSIS — S46.212A BICEPS MUSCLE TEAR, LEFT, INITIAL ENCOUNTER: Primary | ICD-10-CM

## 2017-11-01 PROCEDURE — 99282 EMERGENCY DEPT VISIT SF MDM: CPT

## 2017-11-01 NOTE — ED NOTES
Assumed care of patient. Pt resting in position of comfort. Call bell within reach. Pt presents to ED accompanied by . Pt states on Thursday she was putting up her summer clothes and getting out her winter clothing for her closet. Pt denies any injury to her arm. Reports that evening she noted that her left upper arm was painful, warm to touch and  noted that the \"muscle in her arm was hard a brick and you could see it sitting up\" Pt takes 81 mg aspirin daily. Large bruise noted to entire left bicep area. Good PMS to left arm.

## 2017-11-01 NOTE — ED PROVIDER NOTES
[facility].  EMERGENCY DEPARTMENT HISTORY AND PHYSICAL EXAM         Date of Service: 11/1/2017   Patient Name: Sally Weir   YOB: 1940  Medical Record Number: [id number]    History of Presenting Illness     Chief Complaint   Patient presents with    Arm Injury     Pt. states last Thursday she was putting away clothes and noticed her left arm swelling and bruising. Denies any injury  states he noticed her bicep was \"hard as a rock\"        History Provided By:  patient    Additional History:   Sally Weir is a 76 y.o. female with PMhx significant for myalgias who presents ambulatory to the ED with cc of constant left arm swelling and ecchymosis to the medial bicep/elbow region worsening since 6 days ago. Pt states she was hanging clothes hours before the onset of symptoms.  states her bicep muscle was bulging and hard. She denies any known injury to the arm. Pt endorses taking 81 mg ASA daily, and states she is currently not in any pain. Social Hx: -(former) Tobacco, + EtOH, - Illicit Drugs    There are no other complaints, changes or physical findings at this time.     Primary Care Provider: Ekta Rodríguez MD     Past History     Past Medical History:   Past Medical History:   Diagnosis Date    Advance directive discussed with patient 03/23/2016    Dysuria 5/9/16    [facility]  US report rec'd     Encounter for gynecological examination 5/19/16    notes from [address]    Hearing loss in left ear     Murmur, cardiac     echocardiogram report from Dr Doug Loza  EF 65-70%  1/23/17 note and ekg    Myalgia     Psychiatric disorder     anxiety    Pure hypercholesterolemia 11/14/2009    Screening for glaucoma 07/14/2016    Dr Kassi Reyes note rec'd    Unexplained weight loss 6/6/16    GI spec Dr Betty Philippe note rec'd        Past Surgical History:   Past Surgical History:   Procedure Laterality Date    ENDOSCOPY, COLON, DIAGNOSTIC 2/2005    ad polyp; 3 year repeat ; Eva Hernandez ENDOSCOPY, COLON, DIAGNOSTIC  3/2008    neg; 5 year repeat; Cathyann Hernandez HX GYN      tubes tied    [place] COLONOSCOPY FLX DX W/COLLJ SPEC WHEN PFRMD  11/21/2013; 2008; 2003             Family History:   Family History   Problem Relation Age of Onset    Dementia Mother     Other Mother      Vitamin B12 deficiency/Pernicious anemia    Congenital heart defect Father     Cancer Brother      Prostate CA    Stroke Brother     Cancer Daughter      Non-Hodgkin's lymphoma    Breast Cancer Maternal Aunt 54        Social History:   Social History   Substance Use Topics    Smoking status: Former Smoker     Packs/day: 0.50     Years: 20.00     Types: Cigarettes     Quit date: 12/4/1973    Smokeless tobacco: Never Used    Alcohol use Yes      Comment: very little (2-3x/month)        Allergies: Allergies   Allergen Reactions    Ciprofloxacin Nausea Only        Review of Systems   Review of Systems   Constitutional: Negative for fatigue and fever. HENT: Negative for ear pain and sore throat. Eyes: Negative for pain, redness and visual disturbance. Respiratory: Negative for cough and shortness of breath. Cardiovascular: Negative for chest pain and palpitations. Gastrointestinal: Negative for abdominal pain, nausea and vomiting. Genitourinary: Negative for dysuria, frequency and urgency. Musculoskeletal: Positive for myalgias (left arm pain). Negative for back pain, gait problem, neck pain and neck stiffness. Skin: Positive for color change (brusing to the left arm). Negative for rash and wound. Neurological: Negative for dizziness, weakness, light-headedness, numbness and headaches. Physical Exam  Physical Exam   Constitutional: She is oriented to person, place, and time. She appears well-developed and well-nourished. Non-toxic appearance. No distress. HENT:   Head: Normocephalic and atraumatic.    Right Ear: External ear normal.   Left Ear: External ear normal.   Nose: Nose normal.   Mouth/Throat: Uvula is midline. No trismus in the jaw. Eyes: Conjunctivae and EOM are normal. Pupils are equal, round, and reactive to light. No scleral icterus. Neck: Normal range of motion and full passive range of motion without pain. Cardiovascular: Normal rate and regular rhythm. Pulmonary/Chest: Effort normal. No accessory muscle usage. No tachypnea. No respiratory distress. She has no decreased breath sounds. She has no wheezes. Abdominal: Soft. There is no tenderness. Musculoskeletal: Normal range of motion. Echymosis of the left upper arm medially extending just distal to the elbow  Full active ROM  Mild proximal muscular deformity    Neurological: She is alert and oriented to person, place, and time. She is not disoriented. No cranial nerve deficit. GCS eye subscore is 4. GCS verbal subscore is 5. GCS motor subscore is 6. Skin: Skin is intact. No rash noted. Psychiatric: She has a normal mood and affect. Her speech is normal.   Nursing note and vitals reviewed. Medical Decision Making   I am the first provider for this patient. I reviewed the vital signs, available nursing notes, past medical history, past surgical history, family history and social history. Old Medical Records: Old medical records. Nursing notes. Provider Notes:   Presentation consistent with incomplete bicep tendon tear. Imaging deferred. Will refer to orthopedics. ED Course:  3:18 PM   Initial assessment performed. The patients presenting problems have been discussed, and they are in agreement with the care plan formulated and outlined with them. I have encouraged them to ask questions as they arise throughout their visit. Diagnostic Study Results     Vital Signs-Reviewed the patient's vital signs. Patient Vitals for the past 12 hrs:   Temp Pulse Resp BP SpO2   11/01/17 1430 97.5 °F (36.4 °C) 80 14 186/90 100 %       Diagnosis:  Clinical Impression:   1.  Biceps muscle tear, left, initial encounter         Plan:  1:   Follow-up Information     Follow up With Details Comments [address] Jaramillo MD Schedule an appointment as soon as possible for a visit ORTHO: call to schedule follow up [address] [place] [address]  [phone]            2:   Discharge Medication List as of 11/1/2017  3:27 PM        Return to ED if worse. Disposition:  DISCHARGE NOTE:  3:29 PM  The patient has been re-evaluated and is ready for discharge. Reviewed available results with patient. Counseled patient on diagnosis and care plan. Patient has expressed understanding, and all questions have been answered. Patient agrees with plan and agrees to follow up as recommended, or return to the ED if their symptoms worsen. Discharge instructions have been provided and explained to the patient, along with reasons to return to the ED.  _______________________________   Attestations:     ATTESTATION:  This note is prepared by Mack Baumgarten, acting as Scribe for JEROME Nguyen. JEROME Nguyen: The scribe's documentation has been prepared under my direction and personally reviewed by me in its entirety.  I confirm that the note above accurately reflects all work, treatment, procedures, and medical decision making performed by me.  _______________________________

## 2017-11-01 NOTE — DISCHARGE INSTRUCTIONS
Thank you for allowing us to provide you with care today. We hope we addressed all of your concerns and needs. We strive to provide excellent quality care in the Emergency Department. Please rate us as excellent, as anything less than excellent does not meet our expectations. If you feel that you have not received excellent quality care or timely care, please ask to speak to the nurse manager. Please choose us in the future for your continued health care needs. The exam and treatment you received in the Emergency Department were for an urgent problem and are not intended as complete care. It is important that you follow-up with a doctor, nurse practitioner, or  [id number] assistant to: (1) confirm your diagnosis, (2) re-evaluation of changes in your illness and treatment, and (3) for ongoing care. If your symptoms become worse or you do not improve as expected and you are unable to reach your usual health care provider, you should return to the Emergency Department. We are available 24 hours a day. Take this sheet with you when you go to your follow-up visit. If you have any problem arranging the follow-up visit, contact the Emergency Department immediately. Make an appointment with your Primary Care doctor for follow up of this visit. Return to the ER if you are unable to be seen in the time recommended on your discharge instructions.

## 2017-12-04 ENCOUNTER — LAB ONLY (OUTPATIENT)
Dept: FAMILY MEDICINE CLINIC | Age: 77
End: 2017-12-04

## 2017-12-04 DIAGNOSIS — E78.00 PURE HYPERCHOLESTEROLEMIA: Primary | Chronic | ICD-10-CM

## 2017-12-04 DIAGNOSIS — R82.81 PYURIA: Primary | ICD-10-CM

## 2017-12-04 NOTE — LETTER
12/5/2017 5:17 PM 
 
Ms. Mahad Romero \[facility]\"" [phone] Dear Mahad Chan: 
 
Please find your most recent results below. Resulted Orders LIPID PANEL Result Value Ref Range Cholesterol, total 185 100 - 199 mg/dL Triglyceride 102 0 - 149 mg/dL HDL Cholesterol 57 >39 mg/dL VLDL, calculated 20 5 - 40 mg/dL LDL, calculated 108 (H) 0 - 99 mg/dL Narrative Performed at:  [address]  [id number] : Jocelyne Cornejo MD, Phone:  [phone] CVD REPORT Result Value Ref Range INTERPRETATION Note Comment:  
   Supplemental report is available. Narrative Performed at:  [facility] [place] [address]  [id number] : Dick Kilgore PhD, Phone:  [phone] Lipids much improved. Please call me if you have any questions: [phone] Sincerely, Lab [facility]

## 2017-12-05 LAB
CHOLEST SERPL-MCNC: 185 MG/DL (ref 100–199)
HDLC SERPL-MCNC: 57 MG/DL
INTERPRETATION, 910389: NORMAL
LDLC SERPL CALC-MCNC: 108 MG/DL (ref 0–99)
TRIGL SERPL-MCNC: 102 MG/DL (ref 0–149)
VLDLC SERPL CALC-MCNC: 20 MG/DL (ref 5–40)

## 2017-12-06 LAB — BACTERIA UR CULT: NORMAL

## 2017-12-29 ENCOUNTER — OFFICE VISIT (OUTPATIENT)
Dept: FAMILY MEDICINE CLINIC | Age: 77
End: 2017-12-29

## 2017-12-29 VITALS
SYSTOLIC BLOOD PRESSURE: 146 MMHG | HEART RATE: 82 BPM | TEMPERATURE: 97.8 F | DIASTOLIC BLOOD PRESSURE: 73 MMHG | RESPIRATION RATE: 18 BRPM | HEIGHT: 65 IN | BODY MASS INDEX: 25.74 KG/M2 | OXYGEN SATURATION: 94 % | WEIGHT: 154.5 LBS

## 2017-12-29 DIAGNOSIS — F41.9 ANXIETY: Chronic | ICD-10-CM

## 2017-12-29 DIAGNOSIS — R05.9 COUGH: Primary | ICD-10-CM

## 2017-12-29 RX ORDER — DIAZEPAM 5 MG/1
TABLET ORAL
Qty: 60 TAB | Refills: 2 | Status: SHIPPED | OUTPATIENT
Start: 2017-12-29 | End: 2018-03-21 | Stop reason: SDUPTHER

## 2017-12-29 NOTE — PROGRESS NOTES
Mucus off and on for 25-30 yrs. Usually worse in AM when awakens. Past episode 2 mos. No color to mucus. Non seasonal.  [facility] printout reviewed. Visit Vitals    /73 (BP 1 Location: Left arm, BP Patient Position: Sitting)    Pulse 82    Temp 97.8 °F (36.6 °C) (Oral)    Resp 18    Ht 5' 5\" (1.651 m)    Wt 154 lb 8 oz (70.1 kg)    SpO2 94%    BMI 25.71 kg/m2       Patient alert and cooperative. Lungs clear. Assessment:  1. Intermittent cough with clear mucus, question allergy versus irritant trigger. 2. Controlled med refill. Plan:  1. Refilled Valium for three months. 2. Recommended to try OTC plain Robitussin and vary dose as needed at bedtime and in the morning. 3. Follow up if purulence or fever for antibiotic. 4. Recheck here otherwise prn.

## 2017-12-29 NOTE — PROGRESS NOTES
Orion Ramirez is a 76 y.o. female  Chief Complaint   Patient presents with    Medication Refill     Valium    Cough     mucous in throat. Wants to know what is the best thing to do for it     1. Have you been to the ER, urgent care clinic since your last visit? Hospitalized since your last visit? No    2. Have you seen or consulted any other health care providers outside of the [address] [place] [address] since your last visit? Include any pap smears or colon screening. No    [facility]  Nurse Note for Controlled Substance Refill  Chief Complaint   Patient presents with    Medication Refill     Valium    Cough     mucous in throat. Wants to know what is the best thing to do for it      Patient requests refill of:   Diazepam    Visit Vitals    /73 (BP 1 Location: Left arm, BP Patient Position: Sitting)    Pulse 82    Temp 97.8 °F (36.6 °C) (Oral)    Resp 18    Ht 5' 5\" (1.651 m)    Wt 154 lb 8 oz (70.1 kg)    SpO2 94%    BMI 25.71 kg/m2       · Diagnosis: Anxiety  · Last drug screen date: n/a  · Urine provided today: no  · Treatment agreement/Informed Consent date: yes  ·  reviewed by provider: 2017   · MME per day: 0.0  · Provider for today's encounter : Dr. Diana Beach    · PILL COUNT  Today's Date: 2017  Medication name: Diazepam  Pill strength: 5 mg  How medication is supposed to be taken: Take 1 tablet by mouth every morning and 1/2 tablet by mouth as needed mid day  How medication is being taken: as prescribed or PRN  Date prescription filled: 17  Prescribing Provider: Dr. Diana Beach  # of pills prescribed: 60  # of pills remainin-1/2 tabs   # pills taking per day: 2 tabs  Last dose of medication taken, per patient: 2017 2pm  Pain scale and location of pain: 0  Counted in front of patient. yes  Bottle with contents returned to patient. yes     [facility]  reviewed by provider. yes  Discussed pill count with provider. yes   Per provider, refill medication grantedyes   Follow up as advised. Pt was made aware of provider's decision, and to return as needed for an office visit, if one is not already scheduled at this time. Controlled Substance Refill sheet signed by patient and provider. Provided with naloxone prescription, if taking benzodiazepine, prior overdose, substance abuse, or >= 120 MME per day. Continuation of treatment with controlled substance is justified by patient's functional improvements. Patient will benefit from continued prescribing.

## 2017-12-29 NOTE — MR AVS SNAPSHOT
Visit Information Date & Time Provider Department Dept. Phone Encounter #  
 12/29/2017  2:00 PM Sharon Naik MD [facility] [phone] [id number] Follow-up Instructions Return in about 3 months (around 3/29/2018) for NV refill. Upcoming Health Maintenance Date Due  
 MEDICARE YEARLY EXAM 5/24/2018 BREAST CANCER SCRN MAMMOGRAM 6/21/2018 GLAUCOMA SCREENING Q2Y 7/14/2018 COLONOSCOPY 11/21/2018 DTaP/Tdap/Td series (2 - Td) 3/27/2027 Allergies as of 12/29/2017  Review Complete On: 12/29/2017 By: Evert Diaz LPN Severity Noted Reaction Type Reactions Ciprofloxacin  01/20/2016   Side Effect Nausea Only Current Immunizations  Reviewed on 7/9/2014 No immunizations on file. Not reviewed this visit You Were Diagnosed With   
  
 Codes Comments Cough    -  Primary ICD-10-CM: V64 ICD-9-CM: 786.2 Anxiety     ICD-10-CM: F41.9 ICD-9-CM: 300.00 Vitals BP Pulse Temp Resp Height(growth percentile) Weight(growth percentile) 146/73 (BP 1 Location: Left arm, BP Patient Position: Sitting) 82 97.8 °F (36.6 °C) (Oral) 18 5' 5\" (1.651 m) 154 lb 8 oz (70.1 kg) SpO2 BMI OB Status Smoking Status 94% 25.71 kg/m2 Postmenopausal Former Smoker Vitals History BMI and BSA Data Body Mass Index Body Surface Area 25.71 kg/m 2 1.79 m 2 Preferred Pharmacy Pharmacy Name Phone [address] [phone] Your Updated Medication List  
  
   
This list is accurate as of: 12/29/17  2:37 PM.  Always use your most recent med list.  
  
  
  
  
 aspirin delayed-release 81 mg tablet Take 81 mg by mouth daily. atorvastatin 10 mg tablet Commonly known as:  LIPITOR  
TAKE 1 TABLET EVERY DAY  
  
 diazePAM 5 mg tablet Commonly known as:  VALIUM Take full tablet in the am and 1/2 as needed mid day and at bedtime for anxiety  Indications: anxiety  
  
 magnesium oxide 400 mg tablet Commonly known as:  MAG-OX Take 400 mg by mouth daily. naproxen sodium 220 mg tablet Commonly known as:  NAPROSYN Take 220 mg by mouth daily as needed. VITAMIN D3 1,000 unit tablet Generic drug:  cholecalciferol Take  by mouth daily. Prescriptions Printed Refills  
 diazePAM (VALIUM) 5 mg tablet 2 Sig: Take full tablet in the am and 1/2 as needed mid day and at bedtime for anxiety  Indications: anxiety Class: Print Follow-up Instructions Return in about 3 months (around 3/29/2018) for NV refill. Introducing [facility] SERVICES! Dear Archana Sutton: Thank you for requesting a [employer] account. Our records indicate that you already have an active [employer] account. You can access your account anytime at [email or web address] Did you know that you can access your hospital and ER discharge instructions at any time in [employer]? You can also review all of your test results from your hospital stay or ER visit. Additional Information If you have questions, please visit the Frequently Asked Questions section of the [employer] website at [email or web address] Remember, [employer] is NOT to be used for urgent needs. For medical emergencies, dial 911. Now available from your iPhone and Android! Please provide this summary of care documentation to your next provider. Your primary care clinician is listed as Gregg Melgoza Dr. If you have any questions after today's visit, please call [phone].

## 2018-03-21 ENCOUNTER — OFFICE VISIT (OUTPATIENT)
Dept: FAMILY MEDICINE CLINIC | Age: 78
End: 2018-03-21

## 2018-03-21 VITALS
BODY MASS INDEX: 26.19 KG/M2 | SYSTOLIC BLOOD PRESSURE: 153 MMHG | OXYGEN SATURATION: 94 % | HEIGHT: 65 IN | DIASTOLIC BLOOD PRESSURE: 81 MMHG | WEIGHT: 157.2 LBS | RESPIRATION RATE: 18 BRPM | TEMPERATURE: 96.3 F | HEART RATE: 89 BPM

## 2018-03-21 DIAGNOSIS — R03.0 ELEVATED BLOOD PRESSURE READING WITHOUT DIAGNOSIS OF HYPERTENSION: ICD-10-CM

## 2018-03-21 DIAGNOSIS — F41.9 ANXIETY: Chronic | ICD-10-CM

## 2018-03-21 DIAGNOSIS — R68.2 DRY MOUTH: ICD-10-CM

## 2018-03-21 DIAGNOSIS — R05.9 COUGH: Primary | ICD-10-CM

## 2018-03-21 RX ORDER — DIAZEPAM 5 MG/1
TABLET ORAL
Qty: 60 TAB | Refills: 2 | Status: SHIPPED | OUTPATIENT
Start: 2018-03-21 | End: 2018-06-28 | Stop reason: SDUPTHER

## 2018-03-21 RX ORDER — GUAIFENESIN 100 MG/5ML
200 SOLUTION ORAL
COMMUNITY
End: 2018-06-18 | Stop reason: ALTCHOICE

## 2018-03-21 NOTE — PROGRESS NOTES
[facility]  Nurse Visit for Controlled Substance Refill  Chief Complaint   Patient presents with    Cough     Worse in the mornings with clear mucus. No better. Patient requests refill of: Valium    Visit Vitals    /81 (BP 1 Location: Left arm, BP Patient Position: Sitting)    Pulse 89    Temp 96.3 °F (35.7 °C) (Oral)    Resp 18    Ht 5' 5\" (1.651 m)    Wt 157 lb 3.2 oz (71.3 kg)    SpO2 94%    BMI 26.16 kg/m2       ·   ·  reviewed by provider 3/21/2018     · PILL COUNT  Today's Date: 3/21/2018  Medication name: Valium  Pill strength: 5 mg  How medication is supposed to be taken: 1 in AM and 1/2 prn mid day and at bedtime. How medication is being taken: same as above  Date prescription filled: 3/1/18  Prescribing physician: Dr. Amanda Gay. Read  # of pills prescribed: 60  # of pills remainin  Last dose of medication taken, per patient: 3/21/18  Pain scale and location of pain: 0  Counted in front of patient. Bottle with contents returned to patient. [facility]  reviewed by provider. Discussed pill count with provider. Per provider, refill medication granted. Follow up as advised. Pt was made aware of provider's decision, and to return in 3 months for an office visit, if one is not already scheduled at this time. Controlled Substance Refill sheet signed by patient and provider. Diagnoses and all orders for this visit:    1. Cough    2. Elevated blood pressure reading without diagnosis of hypertension    3. Dry mouth    4. Anxiety  -     diazePAM (VALIUM) 5 mg tablet; Take full tablet in the am and 1/2 as needed mid day and at bedtime for anxiety  Indications: anxiety      No future appointments.

## 2018-03-21 NOTE — PROGRESS NOTES
Chemo Gauthier  Identified pt with two pt identifiers(name and ). Chief Complaint   Patient presents with    Cough     Worse in the mornings with clear mucus. No better. 1. Have you been to the ER, urgent care clinic since your last visit? Hospitalized since your last visit? NO    2. Have you seen or consulted any other health care providers outside of the [address] [place] [address] since your last visit? Include any pap smears or colon screening. NO    Today's provider has been notified of reason for visit, vitals and flowsheets obtained on patients.      Patient received paperwork for advance directive during previous visit but has not completed at this time     Reviewed record In preparation for visit, huddled with provider and have obtained necessary documentation      Health Maintenance Due   Topic    BREAST CANCER SCRN MAMMOGRAM        Wt Readings from Last 3 Encounters:   18 157 lb 3.2 oz (71.3 kg)   17 154 lb 8 oz (70.1 kg)   17 154 lb 1.6 oz (69.9 kg)     Temp Readings from Last 3 Encounters:   17 97.8 °F (36.6 °C) (Oral)   17 97.5 °F (36.4 °C)   17 97.3 °F (36.3 °C) (Oral)     BP Readings from Last 3 Encounters:   17 146/73   17 186/90   17 136/67     Pulse Readings from Last 3 Encounters:   17 82   17 80   17 84     Vitals:    18 1445   Weight: 157 lb 3.2 oz (71.3 kg)   Height: 5' 5\" (1.651 m)   PainSc:   0 - No pain         Learning Assessment:  :     Learning Assessment 2014   PRIMARY LEARNER Patient   HIGHEST LEVEL OF EDUCATION - PRIMARY LEARNER  GRADUATED HIGH SCHOOL OR GED   BARRIERS PRIMARY LEARNER NONE   CO-LEARNER CAREGIVER No   PRIMARY LANGUAGE ENGLISH   LEARNER PREFERENCE PRIMARY READING   ANSWERED BY patient   RELATIONSHIP SELF       Depression Screening:  :     PHQ over the last two weeks 2017   Little interest or pleasure in doing things Not at all   Feeling down, depressed or hopeless Not at all   Total Score PHQ 2 0       Fall Risk Assessment:  :     Fall Risk Assessment, last 12 mths 12/29/2017   Able to walk? Yes   Fall in past 12 months? No       Abuse Screening:  :     Abuse Screening Questionnaire 1/20/2016 7/9/2014   Do you ever feel afraid of your partner? N N   Are you in a relationship with someone who physically or mentally threatens you? N N   Is it safe for you to go home? Y Y       ADL Screening:  :     ADL Assessment 1/20/2016   Feeding yourself No Help Needed   Getting from bed to chair No Help Needed   Getting dressed No Help Needed   Bathing or showering No Help Needed   Walk across the room (includes cane/walker) No Help Needed   Using the telphone No Help Needed   Taking your medications No Help Needed   Preparing meals No Help Needed   Managing money (expenses/bills) No Help Needed   Moderately strenuous housework (laundry) No Help Needed   Shopping for personal items (toiletries/medicines) No Help Needed   Shopping for groceries No Help Needed   Driving No Help Needed   Climbing a flight of stairs No Help Needed   Getting to places beyond walking distances No Help Needed                 Medication reconciliation up to date and corrected with patient at this time.

## 2018-03-21 NOTE — PROGRESS NOTES
Dentist says has dry mouth. Robitussin helps. Rec try OTC lubricants. Tends to occur in AM.  Needs refill on control meds for anxiety. [address] printout reviewed. Visit Vitals    /81 (BP 1 Location: Left arm, BP Patient Position: Sitting)    Pulse 89    Temp 96.3 °F (35.7 °C) (Oral)    Resp 18    Ht 5' 5\" (1.651 m)    Wt 157 lb 3.2 oz (71.3 kg)    SpO2 94%    BMI 26.16 kg/m2     Patient alert and cooperative. Reviewed above. Assessment:  1. Dry mouth. Plan:  1. Continue morning Robitussin as long as it helps. 2. Check with pharmacist, try OTC lubricants, recommended sour candies to increase saliva production. 3. Refilled controlled med. 4. Return in three months for nurse visit, refill. Follow otherwise here prn.

## 2018-03-21 NOTE — MR AVS SNAPSHOT
[address] 
 
 
 [address] [facility] [address] 
[address] [place] [address] 
[phone] Patient: Gloria Su MRN:  [id number] Visit Information Date & Time Provider Department Dept. Phone Encounter #  
 3/21/2018  2:40 PM Vanita Back MD [facility] [phone] [id number] Follow-up Instructions Return in about 3 months (around 6/21/2018) for NV refill. Upcoming Health Maintenance Date Due  
 BREAST CANCER SCRN MAMMOGRAM 7/21/2018* GLAUCOMA SCREENING Q2Y 7/14/2018 COLONOSCOPY 11/21/2018 DTaP/Tdap/Td series (2 - Td) 3/27/2027 *Topic was postponed. The date shown is not the original due date. Allergies as of 3/21/2018  Review Complete On: 3/21/2018 By: Vanita Back MD  
  
 Severity Noted Reaction Type Reactions Ciprofloxacin  01/20/2016   Side Effect Nausea Only Current Immunizations  Reviewed on 7/9/2014 No immunizations on file. Not reviewed this visit You Were Diagnosed With   
  
 Codes Comments Cough    -  Primary ICD-10-CM: E10 ICD-9-CM: 786.2 Elevated blood pressure reading without diagnosis of hypertension     ICD-10-CM: R03.0 ICD-9-CM: 796.2 Dry mouth     ICD-10-CM: R68.2 ICD-9-CM: 527.7 Anxiety     ICD-10-CM: F41.9 ICD-9-CM: 300.00 Vitals BP Pulse Temp Resp Height(growth percentile) Weight(growth percentile) 153/81 (BP 1 Location: Left arm, BP Patient Position: Sitting) 89 96.3 °F (35.7 °C) (Oral) 18 5' 5\" (1.651 m) 157 lb 3.2 oz (71.3 kg) SpO2 BMI OB Status Smoking Status 94% 26.16 kg/m2 Postmenopausal Former Smoker Vitals History BMI and BSA Data Body Mass Index Body Surface Area  
 26.16 kg/m 2 1.81 m 2 Preferred Pharmacy Pharmacy Name Phone [facility] [address] [phone] Your Updated Medication List  
  
   
 This list is accurate as of 3/21/18  3:09 PM.  Always use your most recent med list.  
  
  
  
  
 aspirin delayed-release 81 mg tablet Take 81 mg by mouth daily. atorvastatin 10 mg tablet Commonly known as:  LIPITOR  
TAKE 1 TABLET EVERY DAY  
  
 diazePAM 5 mg tablet Commonly known as:  VALIUM Take full tablet in the am and 1/2 as needed mid day and at bedtime for anxiety  Indications: anxiety  
  
 guaiFENesin 100 mg/5 mL liquid Commonly known as:  ROBITUSSIN Take 200 mg by mouth three (3) times daily as needed for Cough.  
  
 magnesium oxide 400 mg tablet Commonly known as:  MAG-OX Take 400 mg by mouth daily. naproxen sodium 220 mg tablet Commonly known as:  NAPROSYN Take 220 mg by mouth daily as needed. VITAMIN D3 1,000 unit tablet Generic drug:  cholecalciferol Take  by mouth daily. Prescriptions Printed Refills  
 diazePAM (VALIUM) 5 mg tablet 2 Sig: Take full tablet in the am and 1/2 as needed mid day and at bedtime for anxiety  Indications: anxiety Class: Print Follow-up Instructions Return in about 3 months (around 6/21/2018) for NV refill. Introducing hospitals & HEALTH SERVICES! Dear Natty Craig: Thank you for requesting a [employer] account. Our records indicate that you already have an active [employer] account. You can access your account anytime at [email or web address] Did you know that you can access your hospital and ER discharge instructions at any time in [employer]? You can also review all of your test results from your hospital stay or ER visit. Additional Information If you have questions, please visit the Frequently Asked Questions section of the [employer] website at [email or web address] Remember, [employer] is NOT to be used for urgent needs. For medical emergencies, dial 911. Now available from your iPhone and Android! Please provide this summary of care documentation to your next provider. Your primary care clinician is listed as [id number] If you have any questions after today's visit, please call [phone].

## 2018-05-02 DIAGNOSIS — E78.00 PURE HYPERCHOLESTEROLEMIA: Chronic | ICD-10-CM

## 2018-05-03 RX ORDER — ATORVASTATIN CALCIUM 10 MG/1
TABLET, FILM COATED ORAL
Qty: 90 TAB | Refills: 2 | Status: SHIPPED | OUTPATIENT
Start: 2018-05-03 | End: 2019-04-01 | Stop reason: SDUPTHER

## 2018-05-03 NOTE — TELEPHONE ENCOUNTER
PCP: Elliott Willis MD    Last appt: 3/21/2018  Future Appointments  Date Time Provider Citlaly Jessica   6/18/2018 2:40 PM Alvin Perez NP [facility]       Requested Prescriptions     Pending Prescriptions Disp Refills    atorvastatin (LIPITOR) 10 mg tablet [Pharmacy Med Name: ATORVASTATIN CALCIUM 10 MG Tablet] 90 Tab 2     Sig: TAKE 1 TABLET EVERY DAY     Lab Results   Component Value Date/Time    Sodium 140 08/07/2017 09:18 AM    Potassium 4.2 08/07/2017 09:18 AM    Chloride 98 08/07/2017 09:18 AM    CO2 26 08/07/2017 09:18 AM    Anion gap 8 11/05/2009 11:44 AM    Glucose 98 08/07/2017 09:18 AM    BUN 9 08/07/2017 09:18 AM    Creatinine 0.67 08/07/2017 09:18 AM    BUN/Creatinine ratio 13 08/07/2017 09:18 AM    GFR est AA 97 08/07/2017 09:18 AM    GFR est non-AA 84 08/07/2017 09:18 AM    Calcium 9.7 08/07/2017 09:18 AM     Lab Results   Component Value Date/Time    Hemoglobin A1c 5.8 (H) 03/23/2016 12:08 PM     Lab Results   Component Value Date/Time    Cholesterol, total 185 12/04/2017 08:31 AM    HDL Cholesterol 57 12/04/2017 08:31 AM    LDL, calculated 108 (H) 12/04/2017 08:31 AM    VLDL, calculated 20 12/04/2017 08:31 AM    Triglyceride 102 12/04/2017 08:31 AM    CHOL/HDL Ratio 3.0 11/05/2009 11:44 AM     Lab Results   Component Value Date/Time    TSH 3.250 08/07/2017 09:18 AM

## 2018-06-18 ENCOUNTER — OFFICE VISIT (OUTPATIENT)
Dept: FAMILY MEDICINE CLINIC | Age: 78
End: 2018-06-18

## 2018-06-18 VITALS
DIASTOLIC BLOOD PRESSURE: 90 MMHG | OXYGEN SATURATION: 98 % | BODY MASS INDEX: 26.49 KG/M2 | SYSTOLIC BLOOD PRESSURE: 154 MMHG | RESPIRATION RATE: 16 BRPM | HEART RATE: 79 BPM | HEIGHT: 65 IN | WEIGHT: 159 LBS | TEMPERATURE: 97.6 F

## 2018-06-18 DIAGNOSIS — Z13.820 SCREENING FOR OSTEOPOROSIS: ICD-10-CM

## 2018-06-18 DIAGNOSIS — Z00.00 MEDICARE ANNUAL WELLNESS VISIT, SUBSEQUENT: Primary | ICD-10-CM

## 2018-06-18 RX ORDER — MAGNESIUM HYDROXIDE 400 MG/5ML
SUSPENSION, ORAL (FINAL DOSE FORM) ORAL
COMMUNITY
Start: 2018-04-17 | End: 2022-08-19

## 2018-06-18 RX ORDER — LANOLIN ALCOHOL/MO/W.PET/CERES
1000 CREAM (GRAM) TOPICAL DAILY
COMMUNITY
End: 2022-08-19

## 2018-06-18 NOTE — MR AVS SNAPSHOT
[facility] 
 
 
 [address] [facility] [address] 
[address] [facility] [address] 
[phone] Patient: Artie Granger MRN:  [id number] Visit Information Date & Time Provider Department Dept. Phone Encounter #  
 6/18/2018  2:40 PM Liset Harding NP [facility] [phone] [id number] Follow-up Instructions Return in about 1 year (around 6/18/2019) for [facility]. Upcoming Health Maintenance Date Due  
 GLAUCOMA SCREENING Q2Y 7/14/2018 COLONOSCOPY 11/21/2018 BREAST CANCER SCRN MAMMOGRAM 7/21/2018* Influenza Age 5 to Adult 8/1/2018 DTaP/Tdap/Td series (2 - Td) 3/27/2027 *Topic was postponed. The date shown is not the original due date. Allergies as of 6/18/2018  Review Complete On: 6/18/2018 By: Liset Harding NP Severity Noted Reaction Type Reactions Ciprofloxacin  01/20/2016   Side Effect Nausea Only Current Immunizations  Reviewed on 6/18/2018 No immunizations on file. Reviewed by Vidhya Valdez LPN on 1/96/9086 at  2:51 PM  
You Were Diagnosed With   
  
 Codes Comments Medicare annual wellness visit, subsequent    -  Primary ICD-10-CM: Z00.00 ICD-9-CM: V70.0 Vitals BP Pulse Temp Resp Height(growth percentile) Weight(growth percentile) 154/90 (BP 1 Location: Right arm, BP Patient Position: Sitting) 79 97.6 °F (36.4 °C) (Oral) 16 5' 5\" (1.651 m) 159 lb (72.1 kg) SpO2 BMI OB Status Smoking Status 98% 26.46 kg/m2 Postmenopausal Former Smoker Vitals History BMI and BSA Data Body Mass Index Body Surface Area  
 26.46 kg/m 2 1.82 m 2 Preferred Pharmacy Pharmacy Name Phone [address] [place] [address] [phone] Your Updated Medication List  
  
   
This list is accurate as of 6/18/18  3:41 PM.  Always use your most recent med list.  
  
  
  
  
 aspirin delayed-release 81 mg tablet Take 81 mg by mouth daily. atorvastatin 10 mg tablet Commonly known as:  LIPITOR  
TAKE 1 TABLET EVERY DAY  
  
 cyanocobalamin 1,000 mcg tablet Take 1,000 mcg by mouth daily. diazePAM 5 mg tablet Commonly known as:  VALIUM Take full tablet in the am and 1/2 as needed mid day and at bedtime for anxiety  Indications: anxiety  
  
 magnesium oxide 400 mg tablet Commonly known as:  MAG-OX Take 400 mg by mouth daily. naproxen sodium 220 mg tablet Commonly known as:  NAPROSYN Take 220 mg by mouth daily as needed. Potassium Gluconate 595 mg (99 mg) tablet VITAMIN D3 1,000 unit tablet Generic drug:  cholecalciferol Take  by mouth daily. Follow-up Instructions Return in about 1 year (around 6/18/2019) for Erica Vanegas [facility] To-Do List   
 06/22/2018 3:00 PM  
  Appointment with [facility] JEANIE 1 at [address] [place] [address] ([phone]) Shower or bathe using soap and water. Do not use deodorant, powder, perfumes, or lotion the day of your exam.  If your prior mammograms were not performed at [place] [address] please bring films with you or forward prior images 2 days before your procedure. Check in at registration 15min before your appointment time unless you were instructed to do otherwise. A script is not necessary, but if you have one, please bring it on the day of the mammogram or have it faxed to the department. You are responsible for finding a method of transportation to your appointment. If you don't have transportation, please reschedule your appointment at least 24 hours in advance. [facility] [phone] [facility] [place]  [phone] [facility] [address]  [phone] [facility] [phone] [facility] [address] [facility] [phone] Patient Instructions Schedule of Personalized Health Plan The best way to stay healthy is to live a healthy lifestyle.  A healthy lifestyle includes regular exercise, eating a well-balanced diet, keeping a healthy weight and not smoking. Regular physical exams and screening tests are another important way to take care of yourself. Preventive exams provided by health care providers can find health problems early when treatment works best and can keep you from getting certain diseases or illnesses. Preventive services include exams, lab tests, screening tests, shots, and learning information to help you take care of your own health. The CDC recommends pneumonia vaccines for anyone 72 years and older. These vaccines are usually only needed once in a lifetime unless your healthcare provider decides differently. The 2 pneumonia shots available presently are PCV 13 (Prevnar 13) and PPSV23 (Pneumovax 23). Adults 72 years or older who have not previously received PCV13, should receive a dose of PCV13 first, followed 1 year later by a dose of PPSV23. All people over 65 should have a yearly flu vaccine. People over 65 are at medium to high risk for Hepatitis B. Hepatitis B is transmitted through body fluids with a common source being sexual activity or IV drug use. Three shots are needed for complete protection. The CDC recommends the herpes zoster (shingles) vaccine for all adults 61 and older, regardless if a prior episode of zoster has been reported. In addition to your physical exam, some screening tests are recommended: 
 
Osteoporosis screening -There are no signs or symptoms of osteoporosis (weakening of bones). You might not know you have the disease until you break a bone. Thats why its so important to get a bone density test to measure your bone strength. Bone mass measurement is taken with a Dexa scan and is recommended every two years after 72years old or if you have certain risk factors, such as personal history of vertebral fracture or chronic steroid medication use. Diabetes Mellitus screening is recommended every year.   This is a blood test, called a hemoglobin A1c, which measures the average blood sugar over a 3 month period. Glaucoma is an eye disease caused by high pressure in the eye. An eye exam is recommended every year. Cardiovascular screening tests that check your cholesterol and other blood fat (lipid) levels are recommended every five years or yearly if you are on medications for cardiovascular disease. Colorectal Cancer screening tests help to find pre-cancerous polyps (growths in the colon) so they can be removed before they turn into cancer. Screening tests are recommended starting at age 48 or earlier if you have a certain risk factors, such as a family history of colon cancer. Breast Cancer screening is done with a mammogram, a low dose x-ray that looks at breast tissue. It is recommended by the [address] that women 54 years and older get a mammogram every 2 years. After the age of 76, recommendations are based on life expectancy. Cervical Cancer screening is done by a PAP smear during a pelvic exam.  The American College of Obstetricians and Gynecologists states that screening can be discontinued after 72years old if the person has had adequate negative prior screening results and no abnormal history (abnormal = CIN2 or higher level). Here is a list of your current Health Maintenance items including a date when each one is due next: 
Health Maintenance Topic Date Due  GLAUCOMA SCREENING Q2Y  07/14/2018  COLONOSCOPY  11/21/2018  BREAST CANCER SCRN MAMMOGRAM  07/21/2018 (Originally 6/21/2018)  Influenza Age 5 to Adult  08/01/2018  DTaP/Tdap/Td series (2 - Td) 03/27/2027  Bone Densitometry (Dexa) Screening  Completed  ZOSTER VACCINE AGE 60>  Addressed  Pneumococcal 65+ Low/Medium Risk  Addressed You do not have an [address] [place] [address]. [facility] on file. 2) Shingles Vaccine - Shingrix Herpes Zoster (Shingles) Herpes zoster (shingles) is a painful rash caused by the same virus that causes chickenpox. After an episode of chickenpox, the virus retreats to cells of the nervous system, where it can reside quietly for decades. However, later in life, the varicella-zoster virus can become active again. When it reactivates, it causes shingles. Shingles can affect people of all ages. It is particularly common in adults over age 48 years. It is also more common in individuals of all ages with conditions that weaken the immune system. Pain is usually the first sign of shingles. Other symptoms include: fever, chills, headache, numbness, tingling and/or burning pain and a skin rash. The rash can appear anywhere on your body, but most commonly on the torso. It is usually on only 1 side of your body, in a band or beltlike pattern. Treatment:  
There is no cure for shingles - it is usually self-limiting. However, anti-viral medications can reduce the spread of the rash, speed healing and decrease the risk of complications. Supportive Treatment:  
1)  Tylenol or ibuprofen can be used to reduce pain 2) Colloidal oatmeal bath or wet cool compresses may help with itching 3) Reduce stress - exercise, yoga, healthy diet Prevention: The CDC recommends everyone over the age of 61 receive the shingles vaccine. This is recommended for people who have already had a shingles outbreak as it could prevent future occurrences of the disease. According to the CDC, it is also recommended for people born before [birth year] in the [address] [place] [address] who have not had varicella (chicken pox) as they are considered immune. [facility] is a vaccine indicated for prevention of herpes zoster (shingles) in adults aged 48 years and older and is the preferred vaccine for preventing shingles and related complications The Center for Disease Control and Prevention recommended Shingrix for the following: - healthy adults aged 48 years and older to prevent shingles and related complications 
- adults who previously received the current shingles vaccine (Zostavax®) to prevent shingles and related complications Two doses (0.5 mL each) are needed for full efficacy. The vaccines are administered intramuscularly, with the second one administered 2-6 months after the initial vaccine. Introducing [facility]! Dear Ana Keane: Thank you for requesting a [facility] [employer] account. Our records indicate that you already have an active [facility] [employer] account. You can access your account anytime at [email or web address] Did you know that you can access your hospital and ER discharge instructions at any time in [employer]? You can also review all of your test results from your hospital stay or ER visit. Additional Information If you have questions, please visit the Frequently Asked Questions section of the [employer] website at [email or web address] Remember, [facility] is NOT to be used for urgent needs. For medical emergencies, dial 911. Now available from your iPhone and Android! Please provide this summary of care documentation to your next provider. Your primary care clinician is listed as [id number] If you have any questions after today's visit, please call [phone].

## 2018-06-18 NOTE — PROGRESS NOTES
Assessment/Plan:   1. Medicare annual wellness visit, subsequent  -discussed healthy diet and daily exercise  -labs due in August  -HM: declines flu, PNA, Shingrix  -Dexa ordered today  -has ACP booklet, states she and \" need to get it done\"    RTC August for labs                 Date of visit: 6/18/2018       This is an Subsequent Medicare Annual Wellness Visit (AWV), (Performed more than 12 months after effective date of Medicare Part B enrollment and 12 months after last preventive visit)    I have reviewed the patient's medical history in detail and updated the computerized patient record. Kiah Noyola is a [de-identified] y.o. female   History obtained from: the patient.   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.91    Tinnitus, right H93.11    Cervical radiculitis M54.12    DJD (degenerative joint disease) of knee M17.10    Elevated blood pressure reading without diagnosis of hypertension R03.0    Stress due to illness of family member Z63.79     Past Medical History:   Diagnosis Date    Advance directive discussed with patient 03/23/2016    Dysuria 5/9/16    [facility] notes  US report rec'd     Encounter for gynecological examination 5/19/16    notes from [address]    Hearing loss in left ear     Murmur, cardiac     echocardiogram report from Dr John Perez  EF 65-70%  1/23/17 note and ekg    Myalgia     Psychiatric disorder     anxiety    Pure hypercholesterolemia 11/14/2009    Screening for glaucoma 07/14/2016    Dr Audra Elder note rec'd    Unexplained weight loss 6/6/16    GI spec Dr Nikki Bess note rec'd      Past Surgical History:   Procedure Laterality Date    ENDOSCOPY, COLON, DIAGNOSTIC  2/2005    ad polyp; 3 year repeat ; Merilynn Gowers ENDOSCOPY, COLON, DIAGNOSTIC  3/2008    neg; 5 year repeat; [facility] HX GYN      tubes tied    PA COLONOSCOPY FLX DX W/COLLJ SPEC WHEN PFRMD  11/21/2013; 2008; 2003          Allergies   Allergen Reactions    Ciprofloxacin Nausea Only     Current Outpatient Prescriptions   Medication Sig Dispense Refill    atorvastatin (LIPITOR) 10 mg tablet TAKE 1 TABLET EVERY DAY 90 Tab 2    guaiFENesin (ROBITUSSIN) 100 mg/5 mL liquid Take 200 mg by mouth three (3) times daily as needed for Cough.  diazePAM (VALIUM) 5 mg tablet Take full tablet in the am and 1/2 as needed mid day and at bedtime for anxiety  Indications: anxiety 60 Tab 2    magnesium oxide (MAG-OX) 400 mg tablet Take 400 mg by mouth daily.  cholecalciferol (VITAMIN D3) 1,000 unit tablet Take  by mouth daily.  aspirin delayed-release 81 mg tablet Take 81 mg by mouth daily.  naproxen sodium (NAPROSYN) 220 mg tablet Take 220 mg by mouth daily as needed. Family History   Problem Relation Age of Onset    Dementia Mother     Other Mother      Vitamin B12 deficiency/Pernicious anemia    Congenital heart defect Father     Cancer Brother      Prostate CA    Stroke Brother     Cancer Daughter      Non-Hodgkin's lymphoma    Breast Cancer Maternal Aunt 54     Social History   Substance Use Topics    Smoking status: Former Smoker     Packs/day: 0.50     Years: 20.00     Types: Cigarettes     Quit date: 12/4/1973    Smokeless tobacco: Never Used    Alcohol use Yes      Comment: very little (2-3x/month)          Specialists/Care Team   Caro Torres.  Geni Garrett has established care with the following healthcare providers:  Patient Care Team:  Gurdeep Echeverria MD as PCP - General (Family Practice)  Manny Mello MD as Physician (Obstetrics & Gynecology)  Kendra Eubanks MD (Ophthalmology)  José Miguel Shipman MD as Physician (Gastroenterology)  Renay Akins DMD (Dental General Practice)  Vanita Stanley MD (Obstetrics & Gynecology)  Shahab Mendiola MD (Cardiology)  Awais Echavarria RN as Ambulatory Care Navigator (Family Practice)      [address]     Demographics   female  [de-identified] y.o. General Health Questions   -During the past 4 weeks: How would you rate your health in general? Good  How often have you been bothered by feeling dizzy when standing up? never  How much have you been bothered by bodily pain? not   Has your physical and emotional health limited your social activities with family or friends? no    Emotional Health Questions     PHQ over the last two weeks 12/29/2017   Little interest or pleasure in doing things Not at all   Feeling down, depressed or hopeless Not at all   Total Score PHQ 2 0     Health Habits   Please describe your diet habits: 3 meals  Do you get 5 servings of fruits or vegetables daily? yes  Do you exercise regularly? No - except  Housework, gardening     Alcohol and Tobacco Risk Factor Screening:     History   Smoking Status    Former Smoker    Packs/day: 0.50    Years: 20.00    Types: Cigarettes    Quit date: 12/4/1973   Smokeless Tobacco    Never Used     History   Alcohol Use    Yes     Comment: very little (2-3x/month)         Activities of Daily Living and Functional Status   Do you need help with eating, walking, dressing, bathing, toileting, the phone, transportation, shopping, preparing meals, housework, laundry, or medications:  no  -Do you need help managing money? no  -In the past four weeks, was someone available to help you if you needed and wanted help with anything? yes  -Are you confident are you that you can control and manage most of your health problems? yes  -Have you been given information to help you keep track of your medications? yes  -How often do you have trouble taking your medications as prescribed? never    Hearing Loss   Have you noticed any hearing difficulties? no    Fall Risk and Home Safety   Have you fallen 2 or more times in the past year?  no  Does your home have rugs in the hallway, lack grab bars in the bathroom, lack handrails on the stairs or have poor lighting? no  Do you have smoke detectors and check them regularly? yes  Do you have difficulties driving a car? no  Do you always fasten your seat belt when you are in a car? yes  Fall Risk Assessment:    Fall Risk Assessment, last 12 mths 12/29/2017   Able to walk? Yes   Fall in past 12 months? No         Abuse Screen   Patient is not abused    Evaluation of Cognitive Function   Mood/affect:  happy  Orientation: Person, Place, Time and Situation  Appearance: age appropriate  Family member/caregiver input: one    Physical Examination   There were no vitals filed for this visit. There is no height or weight on file to calculate BMI. No exam data present  Patient's timed Up & Go test steady or shorter than 30 seconds? yes      Advice/Referrals/Counseling (as indicated)   Education and counseling provided for any problems identified above: none    Preventive Services       (Preventive care checklist to be included in patient instructions)  Discussed today Done Previously Not Needed     7/2016  Glaucoma screening    2013 q5y  Colorectal cancer screening (colonoscopy)    2013 osteopenia  Osteoporosis Screening (DEXA scan)    2017 - 6/20/18 giacomo  Breast cancer Screening (Mammogram)     x Cervical cancer Screening (Pap smear)     x Prostate cancer Screening      Cardiovascular Screening (Lipid panel)      Diabetes screening test (Hgb A1c)      Abdominal ultrasound for AAA      Low-dose CT for lung cancer screening    declined  Flu vaccine      Hepatitis B vaccine (if at risk)    declined  Pneumococcal 23  Prevnar 13    2017  Tdap vaccine   x   Shingles vaccine      Screening for Hepatitis C (b [id number])     Discussion of Advance Directive     Patient was offered the opportunity to discuss advance care planning:  yes     Does patient have an Advance Directive:  no   If no, did you provide information on [facility]?   Yes - pt states she has booklet at home       Assessment/Plan   Z00.00

## 2018-06-18 NOTE — PATIENT INSTRUCTIONS
Schedule of Personalized Health Plan    The best way to stay healthy is to live a healthy lifestyle. A healthy lifestyle includes regular exercise, eating a well-balanced diet, keeping a healthy weight and not smoking. Regular physical exams and screening tests are another important way to take care of yourself. Preventive exams provided by health care providers can find health problems early when treatment works best and can keep you from getting certain diseases or illnesses. Preventive services include exams, lab tests, screening tests, shots, and learning information to help you take care of your own health. The CDC recommends pneumonia vaccines for anyone 72 years and older. These vaccines are usually only needed once in a lifetime unless your healthcare provider decides differently. The 2 pneumonia shots available presently are PCV 13 (Prevnar 13) and PPSV23 (Pneumovax 23). Adults 72 years or older who have not previously received PCV13, should receive a dose of PCV13 first, followed 1 year later by a dose of PPSV23. All people over 65 should have a yearly flu vaccine. People over 65 are at medium to high risk for Hepatitis B. Hepatitis B is transmitted through body fluids with a common source being sexual activity or IV drug use. Three shots are needed for complete protection. The CDC recommends the herpes zoster (shingles) vaccine for all adults 61 and older, regardless if a prior episode of zoster has been reported. In addition to your physical exam, some screening tests are recommended:    Osteoporosis screening -There are no signs or symptoms of osteoporosis (weakening of bones). You might not know you have the disease until you break a bone. Thats why its so important to get a bone density test to measure your bone strength.  Bone mass measurement is taken with a Dexa scan and is recommended every two years after 72years old or if you have certain risk factors, such as personal history of vertebral fracture or chronic steroid medication use. Diabetes Mellitus screening is recommended every year. This is a blood test, called a hemoglobin A1c, which measures the average blood sugar over a 3 month period. Glaucoma is an eye disease caused by high pressure in the eye. An eye exam is recommended every year. Cardiovascular screening tests that check your cholesterol and other blood fat (lipid) levels are recommended every five years or yearly if you are on medications for cardiovascular disease. Colorectal Cancer screening tests help to find pre-cancerous polyps (growths in the colon) so they can be removed before they turn into cancer. Screening tests are recommended starting at age 48 or earlier if you have a certain risk factors, such as a family history of colon cancer. Breast Cancer screening is done with a mammogram, a low dose x-ray that looks at breast tissue. It is recommended by the [address] [place] [address] Ave that women 54 years and older get a mammogram every 2 years. After the age of 76, recommendations are based on life expectancy. Cervical Cancer screening is done by a PAP smear during a pelvic exam.  The American College of Obstetricians and Gynecologists states that screening can be discontinued after 72years old if the person has had adequate negative prior screening results and no abnormal history (abnormal = CIN2 or higher level).     Here is a list of your current Health Maintenance items including a date when each one is due next:  Health Maintenance   Topic Date Due    GLAUCOMA SCREENING Q2Y  07/14/2018    COLONOSCOPY  11/21/2018    BREAST CANCER SCRN MAMMOGRAM  07/21/2018 (Originally 6/21/2018)    Influenza Age 5 to Adult  08/01/2018    DTaP/Tdap/Td series (2 - Td) 03/27/2027    Bone Densitometry (Dexa) Screening  Completed    ZOSTER VACCINE AGE 60>  Addressed    Pneumococcal 65+ Low/Medium Risk  Addressed       You do not have an Advanced Care Plan on file. 2)   Shingles Vaccine - Shingrix  Herpes Zoster (Shingles)     Herpes zoster (shingles) is a painful rash caused by the same virus that causes chickenpox. After an episode of chickenpox, the virus retreats to cells of the nervous system, where it can reside quietly for decades. However, later in life, the varicella-zoster virus can become active again. When it reactivates, it causes shingles. Shingles can affect people of all ages. It is particularly common in adults over age 48 years. It is also more common in individuals of all ages with conditions that weaken the immune system. Pain is usually the first sign of shingles. Other symptoms include: fever, chills, headache, numbness, tingling and/or burning pain and a skin rash. The rash can appear anywhere on your body, but most commonly on the torso. It is usually on only 1 side of your body, in a band or beltlike pattern. Treatment:   There is no cure for shingles - it is usually self-limiting. However, anti-viral medications can reduce the spread of the rash, speed healing and decrease the risk of complications. Supportive Treatment:   1)  Tylenol or ibuprofen can be used to reduce pain  2) Colloidal oatmeal bath or wet cool compresses may help with itching  3) Reduce stress - exercise, yoga, healthy diet    Prevention:  The CDC recommends everyone over the age of 61 receive the shingles vaccine. This is recommended for people who have already had a shingles outbreak as it could prevent future occurrences of the disease. According to the CDC, it is also recommended for people born before [birth year] in the [address] [place] [address],[address] who have not had varicella (chicken pox) as they are considered immune.      [facility] is a vaccine indicated for prevention of herpes zoster (shingles) in adults aged 48 years and older and is the preferred vaccine for preventing shingles and related complications    The Center for Disease Control and Prevention recommended Shingrix for the following:  - healthy adults aged 48 years and older to prevent shingles and related complications  - adults who previously received the current shingles vaccine (Zostavax®) to prevent shingles and related complications    Two doses (0.5 mL each) are needed for full efficacy. The vaccines are administered intramuscularly, with the second one administered 2-6 months after the initial vaccine.

## 2018-06-18 NOTE — PROGRESS NOTES
Crystal Benítez    Chief Complaint   Patient presents with   Obed-Abdoulaye [address] Visit     rm 12// NONfasting         1. Have you been to the ER, urgent care clinic since your last visit? Hospitalized since your last visit? NO    2. Have you seen or consulted any other health care providers outside of the [address] [place] [address] since your last visit? Include any pap smears or colon screening. NO    Patient does not have advance directive on file and has received paperwork. Reviewed record In preparation for visit, huddled with provider and have obtained  necessary documentation      Provider notified of reason for visit and vitals    Health Maintenance Due   Topic    GLAUCOMA SCREENING Q2Y     COLONOSCOPY        Wt Readings from Last 3 Encounters:   06/18/18 159 lb (72.1 kg)   03/21/18 157 lb 3.2 oz (71.3 kg)   12/29/17 154 lb 8 oz (70.1 kg)     Temp Readings from Last 3 Encounters:   06/18/18 97.6 °F (36.4 °C) (Oral)   03/21/18 96.3 °F (35.7 °C) (Oral)   12/29/17 97.8 °F (36.6 °C) (Oral)     BP Readings from Last 3 Encounters:   06/18/18 154/90   03/21/18 153/81   12/29/17 146/73     Pulse Readings from Last 3 Encounters:   06/18/18 79   03/21/18 89   12/29/17 82         Learning Assessment:  :     Learning Assessment 7/9/2014   PRIMARY LEARNER Patient   HIGHEST LEVEL OF EDUCATION - PRIMARY LEARNER  GRADUATED HIGH SCHOOL OR GED   BARRIERS PRIMARY LEARNER NONE   CO-LEARNER CAREGIVER No   PRIMARY LANGUAGE ENGLISH   LEARNER PREFERENCE PRIMARY READING   ANSWERED BY patient   RELATIONSHIP SELF       Depression Screening:  :     PHQ over the last two weeks 6/18/2018   Little interest or pleasure in doing things Not at all   Feeling down, depressed or hopeless Not at all   Total Score PHQ 2 0       Fall Risk Assessment:  :     Fall Risk Assessment, last 12 mths 6/18/2018   Able to walk? Yes   Fall in past 12 months?  No       Abuse Screening:  :     Abuse Screening Questionnaire 6/18/2018 1/20/2016 7/9/2014   Do you ever feel afraid of your partner? N N N   Are you in a relationship with someone who physically or mentally threatens you? N N N   Is it safe for you to go home? Y Y Y       ADL Screening:  :     ADL Assessment 1/20/2016   Feeding yourself No Help Needed   Getting from bed to chair No Help Needed   Getting dressed No Help Needed   Bathing or showering No Help Needed   Walk across the room (includes cane/walker) No Help Needed   Using the telphone No Help Needed   Taking your medications No Help Needed   Preparing meals No Help Needed   Managing money (expenses/bills) No Help Needed   Moderately strenuous housework (laundry) No Help Needed   Shopping for personal items (toiletries/medicines) No Help Needed   Shopping for groceries No Help Needed   Driving No Help Needed   Climbing a flight of stairs No Help Needed   Getting to places beyond walking distances No Help Needed         Medication reconciliation up to date and corrected with patient at this time.

## 2018-06-22 ENCOUNTER — HOSPITAL ENCOUNTER (OUTPATIENT)
Dept: MAMMOGRAPHY | Age: 78
Discharge: HOME | End: 2018-06-22
Attending: OBSTETRICS & GYNECOLOGY
Payer: MEDICARE

## 2018-06-22 DIAGNOSIS — Z12.31 VISIT FOR SCREENING MAMMOGRAM: ICD-10-CM

## 2018-06-22 DIAGNOSIS — F41.9 ANXIETY: Chronic | ICD-10-CM

## 2018-06-22 PROCEDURE — 77067 SCR MAMMO BI INCL CAD: CPT

## 2018-06-25 RX ORDER — DIAZEPAM 5 MG/1
TABLET ORAL
Qty: 60 TAB | Refills: 0 | OUTPATIENT
Start: 2018-06-25

## 2018-06-28 ENCOUNTER — OFFICE VISIT (OUTPATIENT)
Dept: FAMILY MEDICINE CLINIC | Age: 78
End: 2018-06-28

## 2018-06-28 VITALS
HEART RATE: 86 BPM | DIASTOLIC BLOOD PRESSURE: 86 MMHG | BODY MASS INDEX: 26.41 KG/M2 | WEIGHT: 158.5 LBS | SYSTOLIC BLOOD PRESSURE: 151 MMHG | TEMPERATURE: 98.1 F | OXYGEN SATURATION: 96 % | RESPIRATION RATE: 16 BRPM | HEIGHT: 65 IN

## 2018-06-28 DIAGNOSIS — R03.0 ELEVATED BLOOD PRESSURE READING WITHOUT DIAGNOSIS OF HYPERTENSION: ICD-10-CM

## 2018-06-28 DIAGNOSIS — F41.9 ANXIETY: Primary | Chronic | ICD-10-CM

## 2018-06-28 RX ORDER — DIAZEPAM 5 MG/1
TABLET ORAL
Qty: 60 TAB | Refills: 2 | Status: SHIPPED | OUTPATIENT
Start: 2018-06-28 | End: 2018-09-24 | Stop reason: SDUPTHER

## 2018-06-28 NOTE — PROGRESS NOTES
Bert Pineda is a [de-identified] y.o. female    Chief Complaint   Patient presents with    Medication Refill     1. Have you been to the ER, urgent care clinic since your last visit? Hospitalized since your last visit? No     2. Have you seen or consulted any other health care providers outside of the [address] [place] [address] since your last visit? Include any pap smears or colon screening.   No      Visit Vitals    /86    Pulse 86    Temp 98.1 °F (36.7 °C) (Oral)    Resp 16    Ht 5' 5\" (1.651 m)    Wt 158 lb 8 oz (71.9 kg)    SpO2 96%    BMI 26.38 kg/m2      RANDOM PILL COUNT   Medication Name:   Diazepam  Fill Date:  05/25/2018  Dose:  5mg  Pill Count:  9/60  Last Time Taken:  09:00am 06/28/2018  Results: Expected Amount

## 2018-06-28 NOTE — MR AVS SNAPSHOT
[address] [facility] [address] 
 
 
 [address] 
[address] [place] [id number] 
[phone] Patient: Austyn Issa MRN:  [id number] Visit Information Date & Time Provider Department Dept. Phone Encounter #  
 6/28/2018  3:40 PM Zhang Mark MD [facility] [phone] [id number] Follow-up Instructions Return in about 3 months (around 9/28/2018) for Controlled med refill. Upcoming Health Maintenance Date Due  
 GLAUCOMA SCREENING Q2Y 7/14/2018 COLONOSCOPY 11/21/2018 Influenza Age 5 to Adult 8/1/2018 MEDICARE YEARLY EXAM 6/19/2019 BREAST CANCER SCRN MAMMOGRAM 6/22/2019 DTaP/Tdap/Td series (2 - Td) 3/27/2027 Allergies as of 6/28/2018  Review Complete On: 6/28/2018 By: Zhang Mark MD  
  
 Severity Noted Reaction Type Reactions Ciprofloxacin  01/20/2016   Side Effect Nausea Only Current Immunizations  Reviewed on 6/18/2018 No immunizations on file. Not reviewed this visit You Were Diagnosed With   
  
 Codes Comments Anxiety    -  Primary ICD-10-CM: F41.9 ICD-9-CM: 300.00 Elevated blood pressure reading without diagnosis of hypertension     ICD-10-CM: R03.0 ICD-9-CM: 796.2 Vitals BP Pulse Temp Resp Height(growth percentile) Weight(growth percentile) 151/86 86 98.1 °F (36.7 °C) (Oral) 16 5' 5\" (1.651 m) 158 lb 8 oz (71.9 kg) SpO2 BMI OB Status Smoking Status 96% 26.38 kg/m2 Postmenopausal Former Smoker Vitals History BMI and BSA Data Body Mass Index Body Surface Area  
 26.38 kg/m 2 1.82 m 2 Preferred Pharmacy Pharmacy Name Phone [facility], [address] [place] [address] AT [facility] [phone] Your Updated Medication List  
  
   
This list is accurate as of 6/28/18  4:03 PM.  Always use your most recent med list.  
  
  
  
  
 aspirin delayed-release 81 mg tablet Take 81 mg by mouth daily. atorvastatin 10 mg tablet Commonly known as:  LIPITOR  
TAKE 1 TABLET EVERY DAY  
  
 cyanocobalamin 1,000 mcg tablet Take 1,000 mcg by mouth daily. diazePAM 5 mg tablet Commonly known as:  VALIUM Take full tablet in the am and 1/2 as needed mid day and at bedtime for anxiety  Indications: anxiety  
  
 magnesium oxide 400 mg tablet Commonly known as:  MAG-OX Take 400 mg by mouth daily. naproxen sodium 220 mg tablet Commonly known as:  NAPROSYN Take 220 mg by mouth daily as needed. Potassium Gluconate 595 mg (99 mg) tablet VITAMIN D3 1,000 unit tablet Generic drug:  cholecalciferol Take  by mouth daily. Prescriptions Printed Refills  
 diazePAM (VALIUM) 5 mg tablet 2 Sig: Take full tablet in the am and 1/2 as needed mid day and at bedtime for anxiety  Indications: anxiety Class: Print Follow-up Instructions Return in about 3 months (around 9/28/2018) for Controlled med refill. Introducing \[facility]\"" & HEALTH SERVICES! Dear Stefany Meyer: Thank you for requesting a [employer] account. Our records indicate that you already have an active [employer] account. You can access your account anytime at [email or web address]. Did you know that you can access your hospital and ER discharge instructions at any time in [employer]? You can also review all of your test results from your hospital stay or ER visit. Additional Information If you have questions, please visit the Frequently Asked Questions section of the [employer] website at [email or web address]. Remember, [employer] is NOT to be used for urgent needs. For medical emergencies, dial 911. Now available from your iPhone and Android! Please provide this summary of care documentation to your next provider. Your primary care clinician is listed as [id number]  If you have any questions after today's visit, please call [phone].

## 2018-06-28 NOTE — PROGRESS NOTES
Here for controlled med refill for chronic anxiety. Is on Valium 5 mg BID with no reported problems.  reviewed. Visit Vitals    /86    Pulse 86    Temp 98.1 °F (36.7 °C) (Oral)    Resp 16    Ht 5' 5\" (1.651 m)    Wt 158 lb 8 oz (71.9 kg)    SpO2 96%    BMI 26.38 kg/m2     Patient alert and cooperative. Reviewed above. Assessment:  1. Chronic anxiety, on chronic controlled med. Plan:  1. Three month refill. 2. Return for controlled med refill and fasting annual labs. 3. Follow otherwise here prn.

## 2018-08-21 ENCOUNTER — LAB ONLY (OUTPATIENT)
Dept: FAMILY MEDICINE CLINIC | Age: 78
End: 2018-08-21

## 2018-08-21 DIAGNOSIS — Z00.00 LABORATORY EXAM ORDERED AS PART OF ROUTINE GENERAL MEDICAL EXAMINATION: ICD-10-CM

## 2018-08-21 DIAGNOSIS — E78.00 PURE HYPERCHOLESTEROLEMIA: Primary | Chronic | ICD-10-CM

## 2018-08-22 LAB
ALBUMIN SERPL-MCNC: 4.4 G/DL (ref 3.5–4.7)
ALBUMIN/GLOB SERPL: 2 {RATIO} (ref 1.2–2.2)
ALP SERPL-CCNC: 60 IU/L (ref 39–117)
ALT SERPL-CCNC: 17 IU/L (ref 0–32)
APPEARANCE UR: CLEAR
AST SERPL-CCNC: 18 IU/L (ref 0–40)
BACTERIA #/AREA URNS HPF: NORMAL /[HPF]
BASOPHILS # BLD AUTO: 0 X10E3/UL (ref 0–0.2)
BASOPHILS NFR BLD AUTO: 1 %
BILIRUB SERPL-MCNC: 0.4 MG/DL (ref 0–1.2)
BILIRUB UR QL STRIP: NEGATIVE
BUN SERPL-MCNC: 8 MG/DL (ref 8–27)
BUN/CREAT SERPL: 11 (ref 12–28)
CALCIUM SERPL-MCNC: 9.2 MG/DL (ref 8.7–10.3)
CASTS URNS QL MICRO: NORMAL /LPF
CHLORIDE SERPL-SCNC: 100 MMOL/L (ref 96–106)
CHOLEST SERPL-MCNC: 170 MG/DL (ref 100–199)
CO2 SERPL-SCNC: 26 MMOL/L (ref 20–29)
COLOR UR: YELLOW
CREAT SERPL-MCNC: 0.7 MG/DL (ref 0.57–1)
EOSINOPHIL # BLD AUTO: 0.2 X10E3/UL (ref 0–0.4)
EOSINOPHIL NFR BLD AUTO: 3 %
EPI CELLS #/AREA URNS HPF: NORMAL /HPF
ERYTHROCYTE [DISTWIDTH] IN BLOOD BY AUTOMATED COUNT: 14.1 % (ref 12.3–15.4)
GLOBULIN SER CALC-MCNC: 2.2 G/DL (ref 1.5–4.5)
GLUCOSE SERPL-MCNC: 97 MG/DL (ref 65–99)
GLUCOSE UR QL: NEGATIVE
HCT VFR BLD AUTO: 41.2 % (ref 34–46.6)
HDLC SERPL-MCNC: 53 MG/DL
HGB BLD-MCNC: 13.6 G/DL (ref 11.1–15.9)
HGB UR QL STRIP: NEGATIVE
IMM GRANULOCYTES # BLD: 0 X10E3/UL (ref 0–0.1)
IMM GRANULOCYTES NFR BLD: 0 %
INTERPRETATION, 910389: NORMAL
KETONES UR QL STRIP: NEGATIVE
LDLC SERPL CALC-MCNC: 93 MG/DL (ref 0–99)
LEUKOCYTE ESTERASE UR QL STRIP: ABNORMAL
LYMPHOCYTES # BLD AUTO: 1.4 X10E3/UL (ref 0.7–3.1)
LYMPHOCYTES NFR BLD AUTO: 26 %
MCH RBC QN AUTO: 30 PG (ref 26.6–33)
MCHC RBC AUTO-ENTMCNC: 33 G/DL (ref 31.5–35.7)
MCV RBC AUTO: 91 FL (ref 79–97)
MICRO URNS: ABNORMAL
MONOCYTES # BLD AUTO: 0.5 X10E3/UL (ref 0.1–0.9)
MONOCYTES NFR BLD AUTO: 9 %
MUCOUS THREADS URNS QL MICRO: PRESENT
NEUTROPHILS # BLD AUTO: 3.2 X10E3/UL (ref 1.4–7)
NEUTROPHILS NFR BLD AUTO: 61 %
NITRITE UR QL STRIP: NEGATIVE
PH UR STRIP: 7.5 [PH] (ref 5–7.5)
PLATELET # BLD AUTO: 241 X10E3/UL (ref 150–379)
POTASSIUM SERPL-SCNC: 4.3 MMOL/L (ref 3.5–5.2)
PROT SERPL-MCNC: 6.6 G/DL (ref 6–8.5)
PROT UR QL STRIP: NEGATIVE
RBC # BLD AUTO: 4.54 X10E6/UL (ref 3.77–5.28)
RBC #/AREA URNS HPF: NORMAL /HPF
SODIUM SERPL-SCNC: 141 MMOL/L (ref 134–144)
SP GR UR: 1 (ref 1–1.03)
TRIGL SERPL-MCNC: 120 MG/DL (ref 0–149)
TSH SERPL DL<=0.005 MIU/L-ACNC: 2.98 UIU/ML (ref 0.45–4.5)
UROBILINOGEN UR STRIP-MCNC: 0.2 MG/DL (ref 0.2–1)
VLDLC SERPL CALC-MCNC: 24 MG/DL (ref 5–40)
WBC # BLD AUTO: 5.3 X10E3/UL (ref 3.4–10.8)
WBC #/AREA URNS HPF: NORMAL /HPF

## 2018-08-22 NOTE — PROGRESS NOTES
Verified two paint identifiers, name and . Patient provided with lab results. No questions at this time.

## 2018-09-24 ENCOUNTER — OFFICE VISIT (OUTPATIENT)
Dept: FAMILY MEDICINE CLINIC | Age: 78
End: 2018-09-24

## 2018-09-24 VITALS
OXYGEN SATURATION: 94 % | RESPIRATION RATE: 16 BRPM | SYSTOLIC BLOOD PRESSURE: 139 MMHG | DIASTOLIC BLOOD PRESSURE: 86 MMHG | TEMPERATURE: 97.6 F | WEIGHT: 160.5 LBS | BODY MASS INDEX: 26.74 KG/M2 | HEART RATE: 76 BPM | HEIGHT: 65 IN

## 2018-09-24 DIAGNOSIS — F41.9 ANXIETY: Chronic | ICD-10-CM

## 2018-09-24 RX ORDER — DIAZEPAM 5 MG/1
TABLET ORAL
Qty: 60 TAB | Refills: 2 | Status: SHIPPED | OUTPATIENT
Start: 2018-09-24 | End: 2018-12-28 | Stop reason: SDUPTHER

## 2018-09-24 NOTE — PROGRESS NOTES
Here for controlled med refill for anxiety. No pbs reported. Visit Vitals  /86 (BP 1 Location: Right arm, BP Patient Position: Sitting)  Pulse 76  Temp 97.6 °F (36.4 °C) (Oral)  Resp 16  
 Ht 5' 5\" (1.651 m)  Wt 160 lb 8 oz (72.8 kg)  SpO2 94%  BMI 26.71 kg/m2 Patient alert and cooperative. Here for controlled med refill. New agreement previously completed. Assessment: 1. Anxiety, on chronic controlled med. Plan: 1. Refilled for three months. Return at that time. 2. Follow otherwise here prn.

## 2018-09-24 NOTE — PROGRESS NOTES
Patient presents for Controlled Substance Prescription follow up. Last prescription: 
Diazepam 5mg Take 1 tablet by mouth every morning and 1/2 tablet as needed mid day and at bedtime for anxiety. Bottle matches last prescription. 17 pills remaining in bottle. Patient reports last dose taken at 8:00 am on 09/24/2018  printed. Urine collected. Controlled Substance Agreement letter printed.

## 2018-09-24 NOTE — MR AVS SNAPSHOT
[address] [facility] 
 
 
 [address] 
[address] [place] [address] 
[phone] Patient: Ángela Grajeda MRN:  [id number] Visit Information Date & Time Provider Department Dept. Phone Encounter #  
 9/24/2018  3:20 PM Carlota Moseley MD [facility] [phone] [id number] Follow-up Instructions Return in about 3 months (around 12/24/2018) for Controlled med refill. Upcoming Health Maintenance Date Due Shingrix Vaccine Age 50> (1 of 2) 2[birth year]19/1990 GLAUCOMA SCREENING Q2Y 7/14/2018 COLONOSCOPY 11/21/2018 Influenza Age 5 to Adult 3/31/2019* MEDICARE YEARLY EXAM 6/19/2019 BREAST CANCER SCRN MAMMOGRAM 6/22/2019 DTaP/Tdap/Td series (2 - Td) 3/27/2027 *Topic was postponed. The date shown is not the original due date. Allergies as of 9/24/2018  Review Complete On: 9/24/2018 By: Carlota Moseley MD  
  
 Severity Noted Reaction Type Reactions Ciprofloxacin  01/20/2016   Side Effect Nausea Only Current Immunizations  Reviewed on 6/18/2018 No immunizations on file. Not reviewed this visit You Were Diagnosed With   
  
 Codes Comments Anxiety     ICD-10-CM: F41.9 ICD-9-CM: 300.00 Vitals BP Pulse Temp Resp Height(growth percentile) Weight(growth percentile) 139/86 (BP 1 Location: Right arm, BP Patient Position: Sitting) 76 97.6 °F (36.4 °C) (Oral) 16 5' 5\" (1.651 m) 160 lb 8 oz (72.8 kg) SpO2 BMI OB Status Smoking Status 94% 26.71 kg/m2 Postmenopausal Former Smoker Vitals History BMI and BSA Data Body Mass Index Body Surface Area  
 26.71 kg/m 2 1.83 m 2 [facility], [address] [place], [place] [address]vd AT [facility] [phone] Your Updated Medication List  
  
   
This list is accurate as of 9/24/18  4:03 PM.  Always use your most recent med list.  
  
  
 aspirin delayed-release 81 mg tablet Take 81 mg by mouth daily. atorvastatin 10 mg tablet Commonly known as:  LIPITOR  
TAKE 1 TABLET EVERY DAY  
  
 cyanocobalamin 1,000 mcg tablet Take 1,000 mcg by mouth daily. diazePAM 5 mg tablet Commonly known as:  VALIUM Take full tablet in the am and 1/2 as needed mid day and at bedtime for anxiety  Indications: anxiety  
  
 magnesium oxide 400 mg tablet Commonly known as:  MAG-OX Take 400 mg by mouth daily. naproxen sodium 220 mg tablet Commonly known as:  NAPROSYN Take 220 mg by mouth daily as needed. Potassium Gluconate 595 mg (99 mg) tablet VITAMIN D3 1,000 unit tablet Generic drug:  cholecalciferol Take  by mouth daily. Prescriptions Printed Refills  
 diazePAM (VALIUM) 5 mg tablet 2 Sig: Take full tablet in the am and 1/2 as needed mid day and at bedtime for anxiety  Indications: anxiety Class: Print Follow-up Instructions Return in about 3 months (around 12/24/2018) for Controlled med refill. Introducing [facility] & HEALTH SERVICES! Dear Michael Palafox: Thank you for requesting a [employer] account. Our records indicate that you already have an active [employer] account. You can access your account anytime at [email or web address] Did you know that you can access your hospital and ER discharge instructions at any time in [employer]? You can also review all of your test results from your hospital stay or ER visit. Additional Information If you have questions, please visit the Frequently Asked Questions section of the [employer] website at [email or web address] Remember, [employer] is NOT to be used for urgent needs. For medical emergencies, dial 911. Now available from your iPhone and Android! Please provide this summary of care documentation to your next provider. Your primary care clinician is listed as [address].  If you have any questions after today's visit, please call [phone].

## 2018-12-28 ENCOUNTER — OFFICE VISIT (OUTPATIENT)
Dept: FAMILY MEDICINE CLINIC | Age: 78
End: 2018-12-28

## 2018-12-28 VITALS
OXYGEN SATURATION: 97 % | HEART RATE: 94 BPM | BODY MASS INDEX: 27.04 KG/M2 | DIASTOLIC BLOOD PRESSURE: 62 MMHG | TEMPERATURE: 97.5 F | HEIGHT: 65 IN | WEIGHT: 162.3 LBS | RESPIRATION RATE: 18 BRPM | SYSTOLIC BLOOD PRESSURE: 136 MMHG

## 2018-12-28 DIAGNOSIS — E78.00 PURE HYPERCHOLESTEROLEMIA: ICD-10-CM

## 2018-12-28 DIAGNOSIS — Z82.62 FAMILY HISTORY OF OSTEOPOROSIS: ICD-10-CM

## 2018-12-28 DIAGNOSIS — F41.9 ANXIETY: Primary | ICD-10-CM

## 2018-12-28 DIAGNOSIS — R03.0 ELEVATED BLOOD PRESSURE READING WITHOUT DIAGNOSIS OF HYPERTENSION: ICD-10-CM

## 2018-12-28 RX ORDER — DIAZEPAM 5 MG/1
TABLET ORAL
Qty: 60 TAB | Refills: 2 | Status: SHIPPED | OUTPATIENT
Start: 2018-12-28 | End: 2019-04-16 | Stop reason: SDUPTHER

## 2018-12-28 NOTE — PROGRESS NOTES
HISTORY OF PRESENT ILLNESS Eriberto Beavers is a [de-identified] y.o. female presents with Medication Refill (Rm 13 diazipam) Agree with nurse note. Eriberto Beavers is a pt of Read, Vickye Councilman, MD whom I am seeing while he is out of the office. Pt with elevated BP without diagnosis of htn, hypercholesterolemia, family hx of osteoporosis, and anxiety presents to the office with a BP of 136/62. For anxiety, she takes Valium 5 mg 1 tablet qam and 1/2 tab as needed in afternoon and at bedtime. She has take Valium off and on for 50+ years, tolerating it well. She states, \"It has saved my life, my job and marriage in the past.\"  No SI/SA. Stressors: daughter-in-law with cancer, sister recently had 3rd stroke, holiday season Written by lisa Larson, as dictated by Dr. Doe Olmos DO. 
ROS Review of Systems negative except as noted above in HPI. ALLERGIES:   
Allergies Allergen Reactions  Ciprofloxacin Nausea Only CURRENT MEDICATIONS:   
Outpatient Medications Marked as Taking for the 12/28/18 encounter (Office Visit) with Contreras Montalvo DO Medication Sig Dispense Refill  diazePAM (VALIUM) 5 mg tablet Take full tablet in the am and 1/2 as needed mid day and at bedtime for anxiety 60 Tab 2  
 Potassium Gluconate 595 mg (99 mg) tablet  cyanocobalamin 1,000 mcg tablet Take 1,000 mcg by mouth daily.  atorvastatin (LIPITOR) 10 mg tablet TAKE 1 TABLET EVERY DAY 90 Tab 2  
 magnesium oxide (MAG-OX) 400 mg tablet Take 400 mg by mouth daily.  cholecalciferol (VITAMIN D3) 1,000 unit tablet Take  by mouth daily.  aspirin delayed-release 81 mg tablet Take 81 mg by mouth daily.  naproxen sodium (NAPROSYN) 220 mg tablet Take 220 mg by mouth daily as needed. PAST MEDICAL HISTORY:   
Past Medical History:  
Diagnosis Date  Advance directive discussed with patient 03/23/2016  [facility]  [facility] report rec'd  Encounter for gynecological examination 16  
 notes from [address]  CASTILLO (generalized anxiety disorder)  Hearing loss in left ear  Murmur, cardiac   
 echocardiogram report from Dr Felipe Epps  EF 65-70%  17 note and ekg  Myalgia  Pure hypercholesterolemia 2009  Screening for glaucoma 2016 Dr Oscar Wade note rec'd  Unexplained weight loss 16 GI spec Dr Osmin Pete note rec'd PAST SURGICAL HISTORY:   
Past Surgical History:  
Procedure Laterality Date  ENDOSCOPY, COLON, DIAGNOSTIC  2005  
 ad polyp; 3 year repeat ; Osmin Pete  ENDOSCOPY, COLON, DIAGNOSTIC  3/2008  
 neg; 5 year repeat; Osmin Pete  HX TUBAL LIGATION    
 [place] COLONOSCOPY FLX DX W/COLLJ SPEC WHEN PFRMD  2013; 2003 FAMILY HISTORY:   
Family History Problem Relation Age of Onset  Dementia Mother [facility] Other Mother Vitamin B12 deficiency/Pernicious anemia  Congenital heart defect Father  Cancer Brother Prostate CA  Stroke Brother  Cancer Daughter Non-Hodgkin's lymphoma  Breast Cancer Maternal Aunt 54 SOCIAL HISTORY:   
Social History Socioeconomic History  Marital status:  Spouse name: Not on file  Number of children: Not on file  Years of education: Not on file  Highest education level: Not on file Tobacco Use  Smoking status: Former Smoker Packs/day: 0.50 Years: 20.00 Pack years: 10.00 Types: Cigarettes Last attempt to quit: 1973 Years since quittin.0  Smokeless tobacco: Never Used Substance and Sexual Activity  Alcohol use: Yes Comment: very little (2-3x/month)  Drug use: No  
 Sexual activity: Yes  
  Partners: Male IMMUNIZATIONS:   
There is no immunization history on file for this patient. PHYSICAL EXAMINATION Vital Signs Visit Vitals /62 (BP 1 Location: Left arm, BP Patient Position: Sitting) Pulse 94 Temp 97.5 °F (36.4 °C) (Temporal) Resp 18 Ht 5' 5\" (1.651 m) Wt 162 lb 4.8 oz (73.6 kg) SpO2 97% BMI 27.01 kg/m² Weight Metrics 12/28/2018 9/24/2018 6/28/2018 6/18/2018 3/21/2018 12/29/2017 11/1/2017 Weight 162 lb 4.8 oz 160 lb 8 oz 158 lb 8 oz 159 lb 157 lb 3.2 oz 154 lb 8 oz 154 lb 1.6 oz  
BMI 27.01 kg/m2 26.71 kg/m2 26.38 kg/m2 26.46 kg/m2 26.16 kg/m2 25.71 kg/m2 25.64 kg/m2 General appearance - Well nourished. Well appearing. Well developed. No acute distress. Overweight. Head - Normocephalic. Atraumatic. Eyes - pupils equal and reactive. Extraocular eye movements intact. Sclera anicteric. Mildly injected sclera. Chest - clear to auscultation bilaterally anteriorly and posteriorly. No wheezes. No rales or rhonchi. Breath sounds are symmetrical bilaterally. Unlabored respirations. Heart - normal rate. Regular rhythm. Normal S1, S2. No murmur noted. No rubs, clicks or gallops noted. Neurological - awake, alert and oriented to person, place, and time and event. Cranial nerves II through XII intact. Clear speech. Muscle strength is +5/5 x 4 extremities. Sensation is intact to light touch bilaterally. Steady gait. Heme/Lymph - peripheral pulses normal x 4 extremities. No peripheral edema is noted. Back exam - normal range of motion. No pain on palpation of the spinous processes in the cervical, thoracic, lumbar, sacral regions. No CVA tenderness. Increased thoracic kyphosis. Psychological -   normal behavior, dress and thought processes. Good insight. Good eye contact. Normal affect. Appropriate mood. Normal speech. DATA REVIEWED Lab Results Component Value Date/Time WBC 5.3 08/21/2018 09:40 AM  
 HGB 13.6 08/21/2018 09:40 AM  
 HCT 41.2 08/21/2018 09:40 AM  
 PLATELET 984 50/62/4282 09:40 AM  
 MCV 91 08/21/2018 09:40 AM  
 
Lab Results Component Value Date/Time  Sodium 141 08/21/2018 09:40 AM  
 Potassium 4.3 08/21/2018 09:40 AM  
 Chloride 100 08/21/2018 09:40 AM  
 CO2 26 08/21/2018 09:40 AM  
 Anion gap 8 11/05/2009 11:44 AM  
 Glucose 97 08/21/2018 09:40 AM  
 BUN 8 08/21/2018 09:40 AM  
 Creatinine 0.70 08/21/2018 09:40 AM  
 BUN/Creatinine ratio 11 (L) 08/21/2018 09:40 AM  
 GFR est AA 95 08/21/2018 09:40 AM  
 GFR est non-AA 82 08/21/2018 09:40 AM  
 Calcium 9.2 08/21/2018 09:40 AM  
 Bilirubin, total 0.4 08/21/2018 09:40 AM  
 AST (SGOT) 18 08/21/2018 09:40 AM  
 Alk. phosphatase 60 08/21/2018 09:40 AM  
 Protein, total 6.6 08/21/2018 09:40 AM  
 Albumin 4.4 08/21/2018 09:40 AM  
 Globulin 2.3 06/05/2009 09:50 AM  
 A-G Ratio 2.0 08/21/2018 09:40 AM  
 ALT (SGPT) 17 08/21/2018 09:40 AM  
 
Lab Results Component Value Date/Time Cholesterol, total 170 08/21/2018 09:40 AM  
 HDL Cholesterol 53 08/21/2018 09:40 AM  
 LDL, calculated 93 08/21/2018 09:40 AM  
 VLDL, calculated 24 08/21/2018 09:40 AM  
 Triglyceride 120 08/21/2018 09:40 AM  
 CHOL/HDL Ratio 3.0 11/05/2009 11:44 AM  
 
   
Lab Results Component Value Date/Time Hemoglobin A1c 5.8 (H) 03/23/2016 12:08 PM  
 
Lab Results Component Value Date/Time TSH 2.980 08/21/2018 09:40 AM  
 
 
 
 
 
ASSESSMENT and PLAN 
 
  ICD-10-CM ICD-9-CM 1. Anxiety F41.9 300.00 diazePAM (VALIUM) 5 mg tablet  
 worse with increased family stress (sister had another stroke) 2. Pure hypercholesterolemia E78.00 272.0 3. Elevated blood pressure reading without diagnosis of hypertension R03.0 796.2   
 improving 4. Family history of osteoporosis Z82.62 V17.81 Discussed the patient's BMI with her. The BMI follow up plan is as follows: I have counseled this patient on diet and exercise regimens. Decrease carbohydrates (white foods, sweet foods, sweet drinks and alcohol), increase green leafy vegetables and protein (lean meats and beans) with each meal.  Avoid fried foods. Eat 3-5 small meals daily. Do not skip meals. Increase water intake.     Increase physical activity to 30 minutes daily for health benefit as tolerated. Get 7-8 hours uninterrupted sleep nightly. Chart reviewed and updated. Continue current medications and care. When pt is ready for Valium wean, recommend pt start very slow wean by taking Valium 1/2 tab TID, working closely with Dr. Sajan Vargas and a psychiatrist. Consider long acting anxiolytic for future management. Prescription given to patient during office visit today. Diazepam 5 mg. Cautioned pt about addictive potential yet avoid abrupt cessation of the medication. Controlled Substance Agreement [facility] file. [facility] and [facility] is [facility]. Will defer UDT to Dr. Sajan Vargas upon his return. Defer DEXA scan to Dr. Sajan Vargas. Recommend this due to increased thoracic kyphosis and family hx of Osteoporosis. Counseled patient on health concerns:  Cholesterol, bp, and anxiety. Osteoporosis (used office model). Immunizations noted; Pt declines flu vaccine today. Offered empathy, support, legitimation, prayers, partnership to patient. Praised patient for progress. Follow-up Disposition: 
Return in about 3 months (around 3/28/2019) for med refills. Patient was offered a choice/choices in the treatment plan today. Patient expresses understanding of the plan and agrees with recommendations. Patient declines any additional handouts. Patient is satisfied with previous handouts received from our office Written by lisa Palmer, as dictated by Dr. Mike Hewitt DO. Documentation True and Accepted by [facility].  Jaja Quiñones.

## 2018-12-28 NOTE — PROGRESS NOTES
Patient presents for Controlled Substance Prescription follow up. Last prescription: 
diazePAM (VALIUM) 5 mg tablet 60 Tab 2 9/24/2018 Bottle matches last prescription. 10 pills remaining in bottle. Patient reports last dose taken at1pm on December 28  printed. yes Urine collected. yes Controlled Substance Agreement letter printed. yes

## 2018-12-28 NOTE — PROGRESS NOTES
Rosy Charles  Identified pt with two pt identifiers(name and ). Chief Complaint Patient presents with  Medication Refill Rm 13 diazipam  
 
 
 
1. Have you been to the ER, urgent care clinic since your last visit? Hospitalized since your last visit? NO 
 
2. Have you seen or consulted any other health care providers outside of the [address] [place] [address] since your last visit? Include any pap smears or colon screening. NO Advance Care Planning In the event something were to happen to you and you were unable to speak on your behalf, do you have an Advance Directive/ Living Will in place stating your wishes? NO If yes, do we have a copy on file NO If no, would you like information NO 
 
My Chart My chart gives you direct online access to portions of the electronic medical record (EMR) where your doctor stores your health information (ie, lab results, appointment information, medications, immunizations, and more. It is free. Would you like to set up your my chart? NO 
 
[id number] Weight Metrics 2018 2018 2018 2018 3/21/2018 2017 2017 Weight 162 lb 4.8 oz 160 lb 8 oz 158 lb 8 oz 159 lb 157 lb 3.2 oz 154 lb 8 oz 154 lb 1.6 oz  
BMI 27.01 kg/m2 26.71 kg/m2 26.38 kg/m2 26.46 kg/m2 26.16 kg/m2 25.71 kg/m2 25.64 kg/m2 Medication reconciliation up to date and corrected with patient at this time. Today's provider has been notified of reason for visit, vitals and flowsheets obtained on patients. Reviewed record in preparation for visit, huddled with provider and have obtained necessary documentation. Health Maintenance Due Topic  Shingrix Vaccine Age 50> (1 of 2)  GLAUCOMA SCREENING Q2Y   
 COLONOSCOPY Wt Readings from Last 3 Encounters:  
18 162 lb 4.8 oz (73.6 kg) 18 160 lb 8 oz (72.8 kg) 18 158 lb 8 oz (71.9 kg) Temp Readings from Last 3 Encounters:  
18 97.5 °F (36.4 °C) (Temporal) 09/24/18 97.6 °F (36.4 °C) (Oral) 06/28/18 98.1 °F (36.7 °C) (Oral) BP Readings from Last 3 Encounters:  
12/28/18 136/62  
09/24/18 139/86  
06/28/18 151/86 Pulse Readings from Last 3 Encounters:  
12/28/18 94  
09/24/18 76  
06/28/18 86 Vitals:  
 12/28/18 1341 BP: 136/62 Pulse: 94 Resp: 18 Temp: 97.5 °F (36.4 °C) TempSrc: Temporal  
SpO2: 97% Weight: 162 lb 4.8 oz (73.6 kg) Height: 5' 5\" (1.651 m) PainSc:   0 - No pain Learning Assessment: 
:  
 
Learning Assessment 9/24/2018 7/9/2014 PRIMARY LEARNER Patient Patient HIGHEST LEVEL OF EDUCATION - PRIMARY LEARNER  - GRADUATED HIGH SCHOOL OR GED  
BARRIERS PRIMARY LEARNER - NONE  
CO-LEARNER CAREGIVER - No  
PRIMARY LANGUAGE ENGLISH ENGLISH  
LEARNER PREFERENCE PRIMARY VIDEOS READING  
ANSWERED BY patient patient RELATIONSHIP SELF SELF Depression Screening: 
:  
 
PHQ over the last two weeks 9/24/2018 Little interest or pleasure in doing things Not at all Feeling down, depressed, irritable, or hopeless Not at all Total Score PHQ 2 0 Fall Risk Assessment: 
:  
 
Fall Risk Assessment, last 12 mths 9/24/2018 Able to walk? Yes Fall in past 12 months? No  
 
 
Abuse Screening: 
:  
 
Abuse Screening Questionnaire 9/24/2018 6/18/2018 1/20/2016 7/9/2014 Do you ever feel afraid of your partner? N N N N Are you in a relationship with someone who physically or mentally threatens you? N N N N Is it safe for you to go home? Jason CHASE  
 
 
ADL Screening: 
:  
 

## 2019-03-19 ENCOUNTER — OFFICE VISIT (OUTPATIENT)
Dept: URGENT CARE | Age: 79
End: 2019-03-19

## 2019-03-19 VITALS
WEIGHT: 163 LBS | HEIGHT: 65 IN | RESPIRATION RATE: 18 BRPM | BODY MASS INDEX: 27.16 KG/M2 | HEART RATE: 87 BPM | SYSTOLIC BLOOD PRESSURE: 166 MMHG | OXYGEN SATURATION: 98 % | DIASTOLIC BLOOD PRESSURE: 79 MMHG | TEMPERATURE: 99.1 F

## 2019-03-19 DIAGNOSIS — R53.83 FATIGUE, UNSPECIFIED TYPE: ICD-10-CM

## 2019-03-19 DIAGNOSIS — R35.0 URINARY FREQUENCY: Primary | ICD-10-CM

## 2019-03-19 DIAGNOSIS — R52 GENERALIZED BODY ACHES: ICD-10-CM

## 2019-03-19 LAB
ANION GAP BLD CALC-SCNC: 15 MMOL/L
BILIRUB UR QL STRIP: NEGATIVE
BUN BLD-MCNC: 9 MG/DL
CHLORIDE BLD-SCNC: 103 MMOL/L
CO2 BLD-SCNC: 28 MMOL/L
CREAT BLD-MCNC: 0.6 MG/DL (ref 0.6–1.3)
FLUAV+FLUBV AG NOSE QL IA.RAPID: NEGATIVE POS/NEG
FLUAV+FLUBV AG NOSE QL IA.RAPID: NEGATIVE POS/NEG
GLUCOSE BLD STRIP.AUTO-MCNC: 116 MG/DL
GLUCOSE UR-MCNC: NEGATIVE MG/DL
HCT VFR BLD CALC: 39 %
HGB BLD-MCNC: 13.3 G/DL
IONIZED CALCIUM ISTA,ICAI: 1.26
KETONES P FAST UR STRIP-MCNC: NEGATIVE MG/DL
PH UR STRIP: 6.5 [PH] (ref 4.6–8)
POTASSIUM BLD-SCNC: 4 MMOL/L
PROT UR QL STRIP: NEGATIVE
SODIUM BLD-SCNC: 141 MMOL/L
SP GR UR STRIP: 1 (ref 1–1.03)
UA UROBILINOGEN AMB POC: NORMAL (ref 0.2–1)
URINALYSIS CLARITY POC: NORMAL
URINALYSIS COLOR POC: NORMAL
URINE BLOOD POC: NEGATIVE
URINE LEUKOCYTES POC: NEGATIVE
URINE NITRITES POC: NEGATIVE
VALID INTERNAL CONTROL?: YES

## 2019-03-19 RX ORDER — CEPHALEXIN 500 MG/1
500 CAPSULE ORAL 3 TIMES DAILY
Qty: 21 CAP | Refills: 0 | Status: SHIPPED | OUTPATIENT
Start: 2019-03-19 | End: 2019-03-26

## 2019-03-19 NOTE — PROGRESS NOTES
Efrain Salinas is a 78 y.o. female who complains of urinary frequency, urgency, slight right flank pain, body aches, fatigue x 1 day, without abdominal pain, N/V/D, fever, chills, cough, SOB, CP, congestion, ST. The history is provided by the patient.         Past Medical History:   Diagnosis Date    Advance directive discussed with patient 03/23/2016    Dysuria 5/9/16    [facility]  [facility] report rec'd     Encounter for gynecological examination 5/19/16    notes from [facility] [address] CASTILLO (generalized anxiety disorder)     Hearing loss in left ear     Murmur, cardiac     echocardiogram report from Dr Teddy Charles  EF 65-70%  1/23/17 note and ekg    Myalgia     Pure hypercholesterolemia 11/14/2009    Screening for glaucoma 07/14/2016    Dr Carline Becerra note rec'd    Unexplained weight loss 6/6/16    GI spec Dr Henrique Raya note rec'd        Past Surgical History:   Procedure Laterality Date    ENDOSCOPY, COLON, DIAGNOSTIC  2/2005    ad polyp; 3 year repeat ; [facility] ENDOSCOPY, COLON, DIAGNOSTIC  3/2008    neg; 5 year repeat; Henrique Raya    HX TUBAL LIGATION      [place] COLONOSCOPY FLX DX W/COLLJ [facility] WHEN PFRMD  11/21/2013; 2008; 2003              Family History   Problem Relation Age of Onset    Dementia Mother     Other Mother         Vitamin B12 deficiency/Pernicious anemia    Congenital heart defect Father     Cancer Brother         Prostate CA    Stroke Brother     Cancer Daughter         Non-Hodgkin's lymphoma    Breast Cancer Maternal Aunt 54        Social History     Socioeconomic History    Marital status:      Spouse name: Not on file    Number of children: Not on file    Years of education: Not on file    Highest education level: Not on file   Social Needs    Financial resource strain: Not on file    Food insecurity - worry: Not on file    Food insecurity - inability: Not on file   Exploretrip needs - medical: Not on file   Exploretrip needs - non-medical: Not on file Occupational History    Not on file   Tobacco Use    Smoking status: Former Smoker     Packs/day: 0.50     Years: 20.00     Pack years: 10.00     Types: Cigarettes     Last attempt to quit: 1973     Years since quittin.3    Smokeless tobacco: Never Used   Substance and Sexual Activity    Alcohol use: Yes     Comment: very little (2-3x/month)    Drug use: No    Sexual activity: Yes     Partners: Male   Other Topics Concern    Not on file   Social History Narrative    Not on file                ALLERGIES: Ciprofloxacin    Review of Systems   Constitutional: Positive for fatigue. Negative for activity change, appetite change, chills and fever. HENT: Negative for congestion, ear pain, rhinorrhea, sinus pressure, sinus pain, sore throat and trouble swallowing. Respiratory: Negative for cough, shortness of breath and wheezing. Cardiovascular: Negative for chest pain and palpitations. Gastrointestinal: Negative for abdominal pain, nausea and vomiting. Genitourinary: Positive for flank pain, frequency and urgency. Negative for dysuria. Musculoskeletal: Negative for myalgias. Neurological: Positive for weakness. Negative for dizziness and headaches. Hematological: Negative for adenopathy. Vitals:    19 1411   BP: 166/79   Pulse: 87   Resp: 18   Temp: 99.1 °F (37.3 °C)   SpO2: 98%   Weight: 163 lb (73.9 kg)   Height: 5' 5\" (1.651 m)       Physical Exam   Constitutional: She appears well-developed and well-nourished. No distress. HENT:   Right Ear: Tympanic membrane, external ear and ear canal normal.   Left Ear: Tympanic membrane, external ear and ear canal normal.   Nose: Nose normal. Right sinus exhibits no maxillary sinus tenderness and no frontal sinus tenderness. Left sinus exhibits no maxillary sinus tenderness and no frontal sinus tenderness.    Mouth/Throat: Oropharynx is clear and moist and mucous membranes are normal. No oropharyngeal exudate, posterior oropharyngeal edema, posterior oropharyngeal erythema or tonsillar abscesses. Cardiovascular: Normal rate, regular rhythm and normal heart sounds. Pulmonary/Chest: Effort normal and breath sounds normal. No respiratory distress. She has no wheezes. She has no rales. Abdominal: Soft. Bowel sounds are normal. She exhibits no distension. There is no tenderness. There is no rigidity, no rebound and no guarding. Lymphadenopathy:     She has no cervical adenopathy. Neurological: She is alert. Skin: She is not diaphoretic. Psychiatric: She has a normal mood and affect. Her behavior is normal. Judgment and thought content normal.   Nursing note and vitals reviewed. MDM    ICD-10-CM ICD-9-CM    1. Urinary frequency R35.0 788.41 AMB POC URINALYSIS DIP STICK AUTO W/ MICRO      CULTURE, URINE   2. Generalized body aches R52 780.96 AMB POC MARCELLA INFLUENZA A/B TEST   3. Fatigue, unspecified type R53.83 780.79 AMB POC ISTAT CHEM 8+     Medications Ordered Today   Medications    cephALEXin (KEFLEX) 500 mg capsule     Sig: Take 1 Cap by mouth three (3) times daily for 7 days. Dispense:  21 Cap     Refill:  0     Increase fluids    The patients condition was discussed with the patient and they understand. The patient is to follow up with PCP. If signs and symptoms become worse the pt is to go to the ER. The patient is to take medications as prescribed.      Results for orders placed or performed in visit on 03/19/19   AMB POC URINALYSIS DIP STICK AUTO W/ MICRO   Result Value Ref Range    Color (UA POC)      Clarity (UA POC)      Glucose (UA POC) Negative Negative    Bilirubin (UA POC) Negative Negative    Ketones (UA POC) Negative Negative    Specific gravity (UA POC) 1.005 1.001 - 1.035    Blood (UA POC) Negative Negative    pH (UA POC) 6.5 4.6 - 8.0    Protein (UA POC) Negative Negative    Urobilinogen (UA POC) 0.2 mg/dL 0.2 - 1    Nitrites (UA POC) Negative Negative    Leukocyte esterase (UA POC) Negative Negative   AMB POC MARCELLA INFLUENZA A/B TEST   Result Value Ref Range    VALID INTERNAL CONTROL POC Yes     Influenza A Ag POC Negative Negative Pos/Neg    Influenza B Ag POC Negative Negative Pos/Neg   AMB POC ISTAT CHEM 8+   Result Value Ref Range    Sodium,  MMOL/L    Potassium, POC 4.0 MMOL/L    Chloride,  MMOL/L    Calcium, ionized (POC) 1.26     CO2, POC 28 MMOL/L    Glucose,  MG/DL    BUN, POC 9 MG/DL    Creatinine, POC 0.6 0.6 - 1.3 MG/DL    Hematocrit, POC 39 %    Hemoglobin, POC 13.3 G/DL    Anion gap, POC 15 MMOL/L           Procedures

## 2019-03-21 LAB — BACTERIA UR CULT: NO GROWTH

## 2019-04-01 DIAGNOSIS — E78.00 PURE HYPERCHOLESTEROLEMIA: Chronic | ICD-10-CM

## 2019-04-02 NOTE — TELEPHONE ENCOUNTER
PCP: Lito Almendarez MD    Last appt: 12/28/2018  No future appointments.     Requested Prescriptions     Pending Prescriptions Disp Refills    atorvastatin (LIPITOR) 10 mg tablet [Pharmacy Med Name: ATORVASTATIN CALCIUM 10 MG Tablet] 90 Tab 2     Sig: TAKE 1 TABLET EVERY DAY       Prior labs and Blood pressures:  BP Readings from Last 3 Encounters:   03/19/19 166/79   12/28/18 136/62   09/24/18 139/86     Lab Results   Component Value Date/Time    Sodium 141 08/21/2018 09:40 AM    Potassium 4.3 08/21/2018 09:40 AM    Chloride 100 08/21/2018 09:40 AM    CO2 26 08/21/2018 09:40 AM    Anion gap 8 11/05/2009 11:44 AM    Glucose 97 08/21/2018 09:40 AM    BUN 8 08/21/2018 09:40 AM    Creatinine 0.70 08/21/2018 09:40 AM    BUN/Creatinine ratio 11 (L) 08/21/2018 09:40 AM    GFR est AA 95 08/21/2018 09:40 AM    GFR est non-AA 82 08/21/2018 09:40 AM    Calcium 9.2 08/21/2018 09:40 AM     Lab Results   Component Value Date/Time    Hemoglobin A1c 5.8 (H) 03/23/2016 12:08 PM     Lab Results   Component Value Date/Time    Cholesterol, total 170 08/21/2018 09:40 AM    HDL Cholesterol 53 08/21/2018 09:40 AM    LDL, calculated 93 08/21/2018 09:40 AM    VLDL, calculated 24 08/21/2018 09:40 AM    Triglyceride 120 08/21/2018 09:40 AM    CHOL/HDL Ratio 3.0 11/05/2009 11:44 AM     No results found for: CHANDLER Levin    Lab Results   Component Value Date/Time    TSH 2.980 08/21/2018 09:40 AM

## 2019-04-04 RX ORDER — ATORVASTATIN CALCIUM 10 MG/1
TABLET, FILM COATED ORAL
Qty: 30 TAB | Refills: 0 | Status: SHIPPED | OUTPATIENT
Start: 2019-04-04 | End: 2019-04-16 | Stop reason: SDUPTHER

## 2019-04-16 ENCOUNTER — OFFICE VISIT (OUTPATIENT)
Dept: FAMILY MEDICINE CLINIC | Age: 79
End: 2019-04-16

## 2019-04-16 VITALS
SYSTOLIC BLOOD PRESSURE: 163 MMHG | RESPIRATION RATE: 24 BRPM | DIASTOLIC BLOOD PRESSURE: 82 MMHG | TEMPERATURE: 97.1 F | OXYGEN SATURATION: 94 % | HEIGHT: 63 IN | BODY MASS INDEX: 28.95 KG/M2 | HEART RATE: 71 BPM | WEIGHT: 163.4 LBS

## 2019-04-16 DIAGNOSIS — R03.0 ELEVATED BLOOD PRESSURE READING WITHOUT DIAGNOSIS OF HYPERTENSION: ICD-10-CM

## 2019-04-16 DIAGNOSIS — F41.9 ANXIETY: Primary | Chronic | ICD-10-CM

## 2019-04-16 DIAGNOSIS — F41.9 ANXIETY: ICD-10-CM

## 2019-04-16 DIAGNOSIS — E78.00 PURE HYPERCHOLESTEROLEMIA: Chronic | ICD-10-CM

## 2019-04-16 RX ORDER — ATORVASTATIN CALCIUM 10 MG/1
TABLET, FILM COATED ORAL
Qty: 90 TAB | Refills: 1 | Status: SHIPPED | OUTPATIENT
Start: 2019-04-16 | End: 2019-09-10 | Stop reason: SDUPTHER

## 2019-04-16 RX ORDER — DIAZEPAM 5 MG/1
TABLET ORAL
Qty: 60 TAB | Refills: 2 | Status: SHIPPED | OUTPATIENT
Start: 2019-04-16 | End: 2019-07-12 | Stop reason: SDUPTHER

## 2019-04-16 NOTE — PROGRESS NOTES
Here for controlled med refill.  reviewed. Taking 2 Valium daily, 1/2 to 1 tablet. Former smoker till age 26.  2/2 PPD for 19 yrs. Patient denies chest pain, dyspnea, unexpected weight change, unexpected pain, mood or memory changes. Discussed weaning off the Valium if desired. Pt to check with ins about coverage for one time lung CT for ca screening. Visit Vitals /82 (BP 1 Location: Left arm, BP Patient Position: Sitting) Pulse 71 Temp 97.1 °F (36.2 °C) (Oral) Resp 24 Ht 5' 3\" (1.6 m) Wt 163 lb 6.4 oz (74.1 kg) SpO2 94% BMI 28.95 kg/m² Patient alert and cooperative. Reviewed above. Assessment: 1. Here for controlled med refill for anxiety. Plan: 1. Refilled current med, current dose, current amount. 2. Return in three to four months for wellness, labs and med refill. 3. Follow otherwise here prn.

## 2019-04-16 NOTE — ACP (ADVANCE CARE PLANNING)
In the event something were to happen to you and you were unable to speak on your behalf, do you have an Advance Directive/ Living Will in place stating your wishes?  NO    If yes, do we have a copy on file NO    Patient states that she has the information at home from a previous visit

## 2019-04-16 NOTE — PROGRESS NOTES
Winston Mccord  Identified pt with two pt identifiers(name and ). Chief Complaint Patient presents with  Medication Refill 1. Have you been to the ER, urgent care clinic since your last visit? Hospitalized since your last visit? Yes, [employer] for UTI 2. Have you seen or consulted any other health care providers outside of the [address] [place] [address] since your last visit? Include any pap smears or colon screening. No 
 
 
Would you like to sign up for MyChart today, if you have not already done so? Patient has a mychart If not, would you like information on MyChart, and how to sign up at a later time? No 
 
 
Medication reconciliation up to date and corrected with patient at this time. Today's provider has been notified of reason for visit, vitals and flowsheets obtained on patients. Reviewed record in preparation for visit, huddled with provider and have obtained necessary documentation. Health Maintenance Due Topic  GLAUCOMA SCREENING Q2Y   
 COLONOSCOPY  BREAST CANCER SCRN MAMMOGRAM   
 
 
Wt Readings from Last 3 Encounters:  
19 163 lb 6.4 oz (74.1 kg) 19 163 lb (73.9 kg) 18 162 lb 4.8 oz (73.6 kg) Temp Readings from Last 3 Encounters:  
19 97.1 °F (36.2 °C) (Oral) 19 99.1 °F (37.3 °C)  
18 97.5 °F (36.4 °C) (Temporal) BP Readings from Last 3 Encounters:  
19 163/82  
19 166/79  
18 136/62 Pulse Readings from Last 3 Encounters:  
19 71  
19 87  
18 94 Vitals:  
 19 1133 BP: 163/82 Pulse: 71 Resp: 24 Temp: 97.1 °F (36.2 °C) TempSrc: Oral  
SpO2: 94% Weight: 163 lb 6.4 oz (74.1 kg) Height: 5' 3\" (1.6 m) PainSc:   0 - No pain Learning Assessment: 
:  
 
Learning Assessment 2018 PRIMARY LEARNER Patient Patient HIGHEST LEVEL OF EDUCATION - PRIMARY LEARNER  - GRADUATED HIGH SCHOOL OR GED  
BARRIERS PRIMARY LEARNER - NONE CO-LEARNER CAREGIVER - No  
PRIMARY LANGUAGE ENGLISH ENGLISH  
LEARNER PREFERENCE PRIMARY VIDEOS READING  
ANSWERED BY patient patient RELATIONSHIP SELF SELF Depression Screening: 
:  
 
3 most recent PHQ Screens 4/16/2019 Little interest or pleasure in doing things Not at all Feeling down, depressed, irritable, or hopeless Not at all Total Score PHQ 2 0 Fall Risk Assessment: 
:  
 
Fall Risk Assessment, last 12 mths 4/16/2019 Able to walk? Yes Fall in past 12 months? No  
 
 
Abuse Screening: 
:  
 
Abuse Screening Questionnaire 4/16/2019 9/24/2018 6/18/2018 1/20/2016 7/9/2014 Do you ever feel afraid of your partner? N N N N N Are you in a relationship with someone who physically or mentally threatens you? N N N N N Is it safe for you to go home? Y Y Y Y Y  
 
 
ADL Screening: 
:  
 
ADL Assessment 4/16/2019 Feeding yourself No Help Needed Getting from bed to chair No Help Needed Getting dressed No Help Needed Bathing or showering No Help Needed Walk across the room (includes cane/walker) No Help Needed Using the telphone No Help Needed Taking your medications No Help Needed Preparing meals No Help Needed Managing money (expenses/bills) No Help Needed Moderately strenuous housework (laundry) No Help Needed Shopping for personal items (toiletries/medicines) No Help Needed Shopping for groceries No Help Needed Driving No Help Needed Climbing a flight of stairs No Help Needed Getting to places beyond walking distances No Help Needed Patient presents for Controlled Substance Prescription follow up. Last prescription: 
diazePAM (VALIUM) 5 mg tablet [[id number]]  
  Order Details Dose, Route, Frequency: As Directed Indications of Use: anxiety Dispense Quantity: 60 Tab Refills: 2 Fills remaining: --   
      
Sig: Take full tablet in the am and 1/2 as needed mid day and at bedtime for anxiety  
      
Written Date: 12/28/18 Expiration Date: --    
 Start Date: 12/28/18 End Date: --    
      
Ordering Provider:  -- CARLOS #:  [id number] NPI:  --   
Authorizing Provider:  Rajni Drummond DO CARLOS #:  [id number] NPI:  [id number] Ordering User:  Rajni Drummond DO Diagnosis Association: Anxiety (F41.9) Original Order:  diazePAM (VALIUM) 5 mg tablet [[id number]] Pharmacy:  [facility] [address] AT [address] [place] [address] CARLOS #:  [id number] Pharmacy Comments:  --  
      
Fill quantity remaining:  -- Fill quantity used:  --    
  
Lab Test Results Component Date/Time Value Range & Unit Status AST (SGOT) 08/21/18 0940 18 0 - 40 IU/L Final  
Priority and Order Details Priority Class Routine Print Warnings History No Interaction Warnings Shown Pharm[facility] [address] AT [address] [place] [address] Destination This Medication Went To:  
[id number] [[id number]] Outpatient Medication Detail Disp Refills Start End   
diazePAM (VALIUM) 5 mg tablet 60 Tab 2 12/28/2018 Sig: Take full tablet in the am and 1/2 as needed mid day and at bedtime for anxiety Class: Print Bottle matches last prescription. 2 pills remaining in bottle. Patient reports last dose taken at 0630 am on April 16  printed. Urine collected. Controlled Substance Agreement letter printed.

## 2019-06-19 ENCOUNTER — TELEPHONE (OUTPATIENT)
Dept: FAMILY MEDICINE CLINIC | Age: 79
End: 2019-06-19

## 2019-06-19 NOTE — TELEPHONE ENCOUNTER
Patient called and is going Monday for [facility] and also she would like  to have an order for chest x Ray  She can come and pick that order up.

## 2019-06-20 NOTE — TELEPHONE ENCOUNTER
Returned call to patient, patient wanted a screening chest xray. Explained to patient insurance will not pay for this unless she expresses some type of symptoms. At this time patient is asymptomatic and will return call if anything changes.

## 2019-06-28 ENCOUNTER — HOSPITAL ENCOUNTER (OUTPATIENT)
Dept: MAMMOGRAPHY | Age: 79
Discharge: HOME | End: 2019-06-28
Attending: FAMILY MEDICINE
Payer: MEDICARE

## 2019-06-28 DIAGNOSIS — Z12.39 BREAST SCREENING, UNSPECIFIED: ICD-10-CM

## 2019-06-28 PROCEDURE — 77067 SCR MAMMO BI INCL CAD: CPT

## 2019-07-12 ENCOUNTER — OFFICE VISIT (OUTPATIENT)
Dept: FAMILY MEDICINE CLINIC | Age: 79
End: 2019-07-12

## 2019-07-12 VITALS
WEIGHT: 163.9 LBS | HEART RATE: 70 BPM | RESPIRATION RATE: 22 BRPM | SYSTOLIC BLOOD PRESSURE: 136 MMHG | OXYGEN SATURATION: 94 % | DIASTOLIC BLOOD PRESSURE: 80 MMHG | TEMPERATURE: 97 F | HEIGHT: 63 IN | BODY MASS INDEX: 29.04 KG/M2

## 2019-07-12 DIAGNOSIS — F41.9 ANXIETY: Chronic | ICD-10-CM

## 2019-07-12 RX ORDER — DIAZEPAM 5 MG/1
TABLET ORAL
Qty: 60 TAB | Refills: 2 | Status: SHIPPED | OUTPATIENT
Start: 2019-07-12 | End: 2019-09-30 | Stop reason: SDUPTHER

## 2019-07-12 NOTE — PROGRESS NOTES
Maria Luz Pavon  Identified pt with two pt identifiers(name and ). Chief Complaint   Patient presents with    Medication Refill    Labs       1. Have you been to the ER, urgent care clinic since your last visit? Hospitalized since your last visit? No    2. Have you seen or consulted any other health care providers outside of the [address] [place] [address] since your last visit? Include any pap smears or colon screening. No      Would you like to sign up for MyChart today, if you have not already done so? Patient has a mychart  If not, would you like information on MyChart, and how to sign up at a later time? No      Medication reconciliation up to date and corrected with patient at this time. Today's provider has been notified of reason for visit, vitals and flowsheets obtained on patients. Reviewed record in preparation for visit, huddled with provider and have obtained necessary documentation.       Health Maintenance Due   Topic    GLAUCOMA SCREENING Q2Y     COLONOSCOPY     MEDICARE YEARLY EXAM        Wt Readings from Last 3 Encounters:   19 163 lb 14.4 oz (74.3 kg)   19 163 lb 6.4 oz (74.1 kg)   19 163 lb (73.9 kg)     Temp Readings from Last 3 Encounters:   19 97 °F (36.1 °C) (Oral)   19 97.1 °F (36.2 °C) (Oral)   19 99.1 °F (37.3 °C)     BP Readings from Last 3 Encounters:   19 148/80   19 163/82   19 166/79     Pulse Readings from Last 3 Encounters:   19 70   19 71   19 87     Vitals:    19 1610   BP: 148/80   Pulse: 70   Resp: 22   Temp: 97 °F (36.1 °C)   TempSrc: Oral   SpO2: 94%   Weight: 163 lb 14.4 oz (74.3 kg)   Height: 5' 2.5\" (1.588 m)   PainSc:   0 - No pain         Learning Assessment:  :     Learning Assessment 2018   PRIMARY LEARNER Patient Patient   HIGHEST LEVEL OF EDUCATION - PRIMARY LEARNER  - GRADUATED HIGH SCHOOL OR GED   BARRIERS PRIMARY LEARNER - NONE   CO-LEARNER CAREGIVER - No PRIMARY LANGUAGE ENGLISH ENGLISH   LEARNER PREFERENCE PRIMARY VIDEOS READING   ANSWERED BY patient patient   RELATIONSHIP SELF SELF       Depression Screening:  :     3 most recent PHQ Screens 4/16/2019   Little interest or pleasure in doing things Not at all   Feeling down, depressed, irritable, or hopeless Not at all   Total Score PHQ 2 0       Fall Risk Assessment:  :     Fall Risk Assessment, last 12 mths 4/16/2019   Able to walk? Yes   Fall in past 12 months? No       Abuse Screening:  :     Abuse Screening Questionnaire 4/16/2019 9/24/2018 6/18/2018 1/20/2016 7/9/2014   Do you ever feel afraid of your partner? N N N N N   Are you in a relationship with someone who physically or mentally threatens you? N N N N N   Is it safe for you to go home? Y Y Y Y Y       ADL Screening:  :     ADL Assessment 4/16/2019   Feeding yourself No Help Needed   Getting from bed to chair No Help Needed   Getting dressed No Help Needed   Bathing or showering No Help Needed   Walk across the room (includes cane/walker) No Help Needed   Using the telphone No Help Needed   Taking your medications No Help Needed   Preparing meals No Help Needed   Managing money (expenses/bills) No Help Needed   Moderately strenuous housework (laundry) No Help Needed   Shopping for personal items (toiletries/medicines) No Help Needed   Shopping for groceries No Help Needed   Driving No Help Needed   Climbing a flight of stairs No Help Needed   Getting to places beyond walking distances No Help Needed       Patient presents for Controlled Substance Prescription follow up.     Last prescription:   diazePAM (VALIUM) 5 mg tablet [[id number]]     Order Details   Dose, Route, Frequency: As Directed    Indications of Use: anxiety   Dispense Quantity: 60 Tab Refills: 2 Fills remaining: --           Sig: Take full tablet in the am and 1/2 as needed mid day and at bedtime for anxiety  Indications: anxious          Written Date: 04/16/19 Expiration Date: -- Start Date: 04/16/19 End Date: --            Ordering Provider:  -- CARLOS #:  [id number] NPI:  --    Authorizing Provider:  Noe Fontana MD CARLOS #:  [id number] NPI:  [id number]    Ordering User:  Noe Fontana MD            Diagnosis Association: Anxiety (F41.9)      Original Order:  diazePAM (VALIUM) 5 mg tablet [[id number]]      Pharmacy:  [facility] [address] AT [address] [place] [address] #:  [id number]     Pharmacy Comments:  --          Fill quantity remaining:  -- Fill quantity used:  --        Lab Test Results     Component Date/Time Value Range & Unit Status   AST (SGOT) 08/21/18 0940 18 0 - 40 IU/L Final   Priority and Order Details     Priority Class    Routine Print    Warnings History     No Interaction Warnings [address] [address] - [facility] [place] - [address] AT [address] [facility]     This Medication Went [facility]   Outpatient Medication Detail      Disp Refills Start End    diazePAM (VALIUM) 5 mg tablet 60 Tab 2 4/16/2019     Sig: Take full tablet in the am and 1/2 as needed mid day and at bedtime for anxiety  Indications: anxious    Class: Print      Bottle matches last prescription. 7 pills remaining in bottle. Patient reports last dose taken at 1030 am on July 12   printed. Urine collected. Controlled Substance Agreement letter printed.

## 2019-07-12 NOTE — PROGRESS NOTES
Here for controlled med refill for anxiety. No pbs reported. Patient denies chest pain, dyspnea, unexpected weight change, unexpected pain, mood or memory changes.  reviewed. Visit Vitals  /80 (BP 1 Location: Left arm, BP Patient Position: Sitting)   Pulse 70   Temp 97 °F (36.1 °C) (Oral)   Resp 22   Ht 5' 2.5\" (1.588 m)   Wt 163 lb 14.4 oz (74.3 kg)   LMP  (LMP Unknown)   SpO2 94%   BMI 29.50 kg/m²     Patient alert and cooperative. Reviewed above. Assessment:  1. Anxiety, on prn Valium. Plan:  1. Refilled. 2. Return next month for wellness and annual labs. 3. Follow otherwise here prn.

## 2019-08-21 DIAGNOSIS — Z01.89 LABORATORY EXAMINATION: ICD-10-CM

## 2019-08-21 DIAGNOSIS — E78.00 PURE HYPERCHOLESTEROLEMIA: Primary | Chronic | ICD-10-CM

## 2019-08-23 LAB
ALBUMIN SERPL-MCNC: 4.6 G/DL (ref 3.5–4.7)
ALBUMIN/GLOB SERPL: 2 {RATIO} (ref 1.2–2.2)
ALP SERPL-CCNC: 69 IU/L (ref 39–117)
ALT SERPL-CCNC: 16 IU/L (ref 0–32)
APPEARANCE UR: CLEAR
AST SERPL-CCNC: 17 IU/L (ref 0–40)
BASOPHILS # BLD AUTO: 0 X10E3/UL (ref 0–0.2)
BASOPHILS NFR BLD AUTO: 1 %
BILIRUB SERPL-MCNC: 0.4 MG/DL (ref 0–1.2)
BILIRUB UR QL STRIP: NEGATIVE
BUN SERPL-MCNC: 8 MG/DL (ref 8–27)
BUN/CREAT SERPL: 11 (ref 12–28)
CALCIUM SERPL-MCNC: 9.5 MG/DL (ref 8.7–10.3)
CHLORIDE SERPL-SCNC: 102 MMOL/L (ref 96–106)
CHOLEST SERPL-MCNC: 182 MG/DL (ref 100–199)
CO2 SERPL-SCNC: 25 MMOL/L (ref 20–29)
COLOR UR: YELLOW
CREAT SERPL-MCNC: 0.75 MG/DL (ref 0.57–1)
EOSINOPHIL # BLD AUTO: 0.2 X10E3/UL (ref 0–0.4)
EOSINOPHIL NFR BLD AUTO: 3 %
ERYTHROCYTE [DISTWIDTH] IN BLOOD BY AUTOMATED COUNT: 13.7 % (ref 12.3–15.4)
GLOBULIN SER CALC-MCNC: 2.3 G/DL (ref 1.5–4.5)
GLUCOSE SERPL-MCNC: 97 MG/DL (ref 65–99)
GLUCOSE UR QL: NEGATIVE
HCT VFR BLD AUTO: 41.8 % (ref 34–46.6)
HDLC SERPL-MCNC: 55 MG/DL
HGB BLD-MCNC: 13.9 G/DL (ref 11.1–15.9)
HGB UR QL STRIP: NEGATIVE
IMM GRANULOCYTES # BLD AUTO: 0 X10E3/UL (ref 0–0.1)
IMM GRANULOCYTES NFR BLD AUTO: 0 %
INTERPRETATION, 910389: NORMAL
KETONES UR QL STRIP: NEGATIVE
LDLC SERPL CALC-MCNC: 107 MG/DL (ref 0–99)
LEUKOCYTE ESTERASE UR QL STRIP: NEGATIVE
LYMPHOCYTES # BLD AUTO: 2.4 X10E3/UL (ref 0.7–3.1)
LYMPHOCYTES NFR BLD AUTO: 33 %
MCH RBC QN AUTO: 30.5 PG (ref 26.6–33)
MCHC RBC AUTO-ENTMCNC: 33.3 G/DL (ref 31.5–35.7)
MCV RBC AUTO: 92 FL (ref 79–97)
MICRO URNS: ABNORMAL
MONOCYTES # BLD AUTO: 0.5 X10E3/UL (ref 0.1–0.9)
MONOCYTES NFR BLD AUTO: 7 %
NEUTROPHILS # BLD AUTO: 4.1 X10E3/UL (ref 1.4–7)
NEUTROPHILS NFR BLD AUTO: 56 %
NITRITE UR QL STRIP: NEGATIVE
PH UR STRIP: 8 [PH] (ref 5–7.5)
PLATELET # BLD AUTO: 250 X10E3/UL (ref 150–450)
POTASSIUM SERPL-SCNC: 4.4 MMOL/L (ref 3.5–5.2)
PROT SERPL-MCNC: 6.9 G/DL (ref 6–8.5)
PROT UR QL STRIP: NEGATIVE
RBC # BLD AUTO: 4.56 X10E6/UL (ref 3.77–5.28)
SODIUM SERPL-SCNC: 143 MMOL/L (ref 134–144)
SP GR UR: 1.01 (ref 1–1.03)
TRIGL SERPL-MCNC: 99 MG/DL (ref 0–149)
TSH SERPL DL<=0.005 MIU/L-ACNC: 4.55 UIU/ML (ref 0.45–4.5)
UROBILINOGEN UR STRIP-MCNC: 0.2 MG/DL (ref 0.2–1)
VLDLC SERPL CALC-MCNC: 20 MG/DL (ref 5–40)
WBC # BLD AUTO: 7.2 X10E3/UL (ref 3.4–10.8)

## 2019-09-10 DIAGNOSIS — E78.00 PURE HYPERCHOLESTEROLEMIA: Chronic | ICD-10-CM

## 2019-09-11 RX ORDER — ATORVASTATIN CALCIUM 10 MG/1
TABLET, FILM COATED ORAL
Qty: 90 TAB | Refills: 3 | Status: SHIPPED | OUTPATIENT
Start: 2019-09-11 | End: 2020-09-22

## 2019-09-30 ENCOUNTER — OFFICE VISIT (OUTPATIENT)
Dept: FAMILY MEDICINE CLINIC | Age: 79
End: 2019-09-30

## 2019-09-30 VITALS
SYSTOLIC BLOOD PRESSURE: 154 MMHG | HEART RATE: 77 BPM | HEIGHT: 63 IN | BODY MASS INDEX: 28.77 KG/M2 | RESPIRATION RATE: 20 BRPM | OXYGEN SATURATION: 94 % | WEIGHT: 162.4 LBS | TEMPERATURE: 97.2 F | DIASTOLIC BLOOD PRESSURE: 87 MMHG

## 2019-09-30 DIAGNOSIS — Z00.00 MEDICARE ANNUAL WELLNESS VISIT, SUBSEQUENT: Primary | ICD-10-CM

## 2019-09-30 DIAGNOSIS — R79.89 ABNORMAL TSH: ICD-10-CM

## 2019-09-30 DIAGNOSIS — R03.0 ELEVATED BLOOD PRESSURE READING WITHOUT DIAGNOSIS OF HYPERTENSION: ICD-10-CM

## 2019-09-30 DIAGNOSIS — F41.9 ANXIETY: Chronic | ICD-10-CM

## 2019-09-30 DIAGNOSIS — Z71.89 ADVANCED DIRECTIVES, COUNSELING/DISCUSSION: ICD-10-CM

## 2019-09-30 RX ORDER — LEVOTHYROXINE SODIUM 25 UG/1
25 TABLET ORAL
Qty: 30 TAB | Refills: 1 | Status: SHIPPED | OUTPATIENT
Start: 2019-09-30 | End: 2019-09-30 | Stop reason: SDUPTHER

## 2019-09-30 RX ORDER — DIAZEPAM 5 MG/1
TABLET ORAL
Qty: 60 TAB | Refills: 2 | Status: SHIPPED | OUTPATIENT
Start: 2019-09-30 | End: 2020-01-09 | Stop reason: SDUPTHER

## 2019-09-30 NOTE — ACP (ADVANCE CARE PLANNING)
Advance Care Planning    Advance Care Planning (ACP) Provider Conversation Snapshot    Date of ACP Conversation: 09/30/19  Persons included in Conversation:  patient and family  Length of ACP Conversation in minutes:  <16 minutes (Non-Billable)    Authorized Decision Maker (if patient is incapable of making informed decisions): This person is:    Other Legally Authorized Decision Maker (e.g. Next of Kin)            For Patients with Decision Making Capacity:   Values/Goals: Exploration of values, goals, and preferences if recovery is not expected, even with continued medical treatment in the event of:  Imminent death  Severe, permanent brain injury    Conversation Outcomes / Follow-Up Plan:   Recommended completion of Advance Directive form after review of ACP materials and conversation with prospective healthcare agent

## 2019-09-30 NOTE — PATIENT INSTRUCTIONS
Medicare Wellness Visit, Female The best way to live healthy is to have a lifestyle where you eat a well-balanced diet, exercise regularly, limit alcohol use, and quit all forms of tobacco/nicotine, if applicable. Regular preventive services are another way to keep healthy. Preventive services (vaccines, screening tests, monitoring & exams) can help personalize your care plan, which helps you manage your own care. Screening tests can find health problems at the earliest stages, when they are easiest to treat. [facility] follows the current, evidence-based guidelines published by the [facility] Malia ([facility]) when recommending preventive services for our patients. Because we follow these guidelines, sometimes recommendations change over time as research supports it. (For example, mammograms used to be recommended annually. Even though Medicare will still pay for an annual mammogram, the newer guidelines recommend a mammogram every two years for women of average risk.) Of course, you and your doctor may decide to screen more often for some diseases, based on your risk and your health status. Preventive services for you include: - Medicare offers their members a free annual wellness visit, which is time for you and your primary care provider to discuss and plan for your preventive service needs. Take advantage of this benefit every year! 
-All adults over the age of 72 should receive the recommended pneumonia vaccines. Current USPSTF guidelines recommend a series of two vaccines for the best pneumonia protection.  
-All adults should have a flu vaccine yearly and a tetanus vaccine every 10 years. All adults age 61 and older should receive a shingles vaccine once in their lifetime.   
-A bone mass density test is recommended when a woman turns 65 to screen for osteoporosis. This test is only recommended one time, as a screening. Some providers will use this same test as a disease monitoring tool if you already have osteoporosis. -All adults age 38-68 who are overweight should have a diabetes screening test once every three years.  
-Other screening tests and preventive services for persons with diabetes include: an eye exam to screen for diabetic retinopathy, a kidney function test, a foot exam, and stricter control over your cholesterol.  
-Cardiovascular screening for adults with routine risk involves an electrocardiogram (ECG) at intervals determined by your doctor.  
-Colorectal cancer screenings should be done for adults age 54-65 with no increased risk factors for colorectal cancer. There are a number of acceptable methods of screening for this type of cancer. Each test has its own benefits and drawbacks. Discuss with your doctor what is most appropriate for you during your annual wellness visit. The different tests include: colonoscopy (considered the best screening method), a fecal occult blood test, a fecal DNA test, and sigmoidoscopy. -Breast cancer screenings are recommended every other year for women of normal risk, age 54-69. 
-Cervical cancer screenings for women over age 72 are only recommended with certain risk factors.  
-All adults born between [facility] should be screened once for Hepatitis C. Here is a list of your current Health Maintenance items (your personalized list of preventive services) with a due date: 
Health Maintenance Due Topic Date Due  Glaucoma Screening   07/14/2018  Colonoscopy  11/21/2018 [facility] Annual Well Visit  06/19/2019

## 2019-09-30 NOTE — PROGRESS NOTES
Needs controlled med refill for anxiety.  reviewed. Needs new agreement completed. Patient denies chest pain, dyspnea, unexpected weight change, unexpected pain, mood or memory changes. Visit Vitals  /87 (BP 1 Location: Left arm, BP Patient Position: Sitting)   Pulse 77   Temp 97.2 °F (36.2 °C) (Oral)   Resp 20   Ht 5' 2.5\" (1.588 m)   Wt 162 lb 6.4 oz (73.7 kg)   LMP  (LMP Unknown)   SpO2 94%   BMI 29.23 kg/m²     Patient alert and cooperative. Reviewed above. Assessment:  1. Here for controlled med refill for anxiety. 2. Borderline elevated TSH. Wants to try low dose thyroid for a month to see if benefit with hair, changes and fatigue. 3. Elevated BP today in office. Plan:  1. Given (#30) with a refill of 25 mcg Levothyroxine. 2. Return in four to six weeks for lab recheck on this dose. 3. Refilled usual Diazepam script. 4. Follow up if persistent elevated BP. 5. Recheck here otherwise prn. This is the Subsequent Medicare Annual Wellness Exam, performed 12 months or more after the Initial AWV or the last Subsequent AWV    I have reviewed the patient's medical history in detail and updated the computerized patient record. Eye doctor 1-2 yrs. Dentist 4 x annually. Colonoscopy '13. GI said 5 yr repeat. Pt declines. Mammo annually. DEXA 6 yrs ago. No signif hx past yr.     History     Past Medical History:   Diagnosis Date    Advance directive discussed with patient 03/23/2016    Dysuria 5/9/16    [facility]  US report rec'd     Encounter for gynecological examination 5/19/16    notes from [facility] [address] [place] (generalized anxiety disorder)     Hearing loss in left ear     Murmur, cardiac     echocardiogram report from Dr Shyann Montalvo  EF 65-70%  1/23/17 note and ekg    Myalgia     Pure hypercholesterolemia 11/14/2009    Screening for glaucoma 07/14/2016    Dr Byrnes Patient note rec'd    Unexplained weight loss 6/6/16    GI spec Dr Usman Sharif note rec'd      Past Surgical History:   Procedure Laterality Date    ENDOSCOPY, COLON, DIAGNOSTIC  2005    ad polyp; 3 year repeat ; Therese Bajwa ENDOSCOPY, COLON, DIAGNOSTIC  3/2008    neg; 5 year repeat; Cheikh Haley    HX TUBAL LIGATION      CA COLONOSCOPY FLX DX W/COLLJ SPEC WHEN PFRMD  2013; 2003          Current Outpatient Medications   Medication Sig Dispense Refill    diazePAM (VALIUM) 5 mg tablet Take full tablet in the am and 1/2 as needed mid day and at bedtime for anxiety  Indications: anxious 60 Tab 2    atorvastatin (LIPITOR) 10 mg tablet TAKE 1 TABLET EVERY DAY 90 Tab 3    Potassium Gluconate 595 mg (99 mg) tablet       cyanocobalamin 1,000 mcg tablet Take 1,000 mcg by mouth daily.  magnesium oxide (MAG-OX) 400 mg tablet Take 400 mg by mouth daily.  cholecalciferol (VITAMIN D3) 1,000 unit tablet Take  by mouth daily.  aspirin delayed-release 81 mg tablet Take 81 mg by mouth daily.        Allergies   Allergen Reactions    Ciprofloxacin Nausea Only     Family History   Problem Relation Age of Onset    Dementia Mother     Other Mother         Vitamin B12 deficiency/Pernicious anemia    Congenital heart defect Father     Cancer Brother         Prostate CA    Stroke Brother     Cancer Daughter         Non-Hodgkin's lymphoma    Breast Cancer Maternal Aunt 54     Social History     Tobacco Use    Smoking status: Former Smoker     Packs/day: 0.50     Years: 20.00     Pack years: 10.00     Types: Cigarettes     Last attempt to quit: 1973     Years since quittin.8    Smokeless tobacco: Never Used   Substance Use Topics    Alcohol use: Yes     Comment: very little (2-3x/month)     Patient Active Problem List   Diagnosis Code    Pure hypercholesterolemia E78.00    Anxiety F41.9    Hearing loss in right ear H91.91    DJD (degenerative joint disease) of knee M17.10    Elevated blood pressure reading without diagnosis of hypertension R03.0       Depression Risk Factor Screening:     3 most recent PHQ Screens 9/30/2019   Little interest or pleasure in doing things Not at all   Feeling down, depressed, irritable, or hopeless Not at all   Total Score PHQ 2 0     Alcohol Risk Factor Screening: You do not drink alcohol or very rarely. Functional Ability and Level of Safety:   Hearing Loss  The patient needs further evaluation. Activities of Daily Living  The home contains: handrails and grab bars  Patient does total self care    Fall Risk  Fall Risk Assessment, last 12 mths 9/30/2019   Able to walk? Yes   Fall in past 12 months? No       Abuse Screen  Patient is not abused    Cognitive Screening   Evaluation of Cognitive Function:  Has your family/caregiver stated any concerns about your memory: no  Normal    Patient Care Team   Patient Care Team:  Azam Taylor MD as PCP - General (Family Practice)  Alyx Santamaria MD as Physician (Obstetrics & Gynecology)  Malu Gupta MD (Ophthalmology)  Flaco Harris MD as Physician (Gastroenterology)  Elyssa Orr DMD (Dental General Practice)  Nivia Aguilera MD (Obstetrics & Gynecology)  Alan Black MD (Cardiology)  Leslie Escobedo RN as Ambulatory Care Navigator (Family Practice)    Assessment/Plan   Education and counseling provided:  Are appropriate based on today's review and evaluation  End-of-Life planning (with patient's consent)  Screening Mammography  Colorectal cancer screening tests  Cardiovascular screening blood test  Bone mass measurement (DEXA)  Screening for glaucoma  Diabetes screening test    Diagnoses and all orders for this visit:    1. Medicare annual wellness visit, subsequent    2. Anxiety  -     diazePAM (VALIUM) 5 mg tablet; Take full tablet in the am and 1/2 as needed mid day and at bedtime for anxiety  Indications: anxious    3. Abnormal TSH  -     levothyroxine (SYNTHROID) 25 mcg tablet; Take 1 Tab by mouth Daily (before breakfast). 4. Advanced directives, counseling/discussion    5. Elevated blood pressure reading without diagnosis of hypertension        Health Maintenance Due   Topic Date Due    GLAUCOMA SCREENING Q2Y  07/14/2018    COLONOSCOPY  11/21/2018    MEDICARE YEARLY EXAM  06/19/2019

## 2019-09-30 NOTE — PROGRESS NOTES
Rhonda Rodríguez  Identified pt with two pt identifiers(name and ). Chief Complaint   Patient presents with   [address] [place] [address] Annual Wellness Visit    Results    Medication Refill       1. Have you been to the ER, urgent care clinic since your last visit? Hospitalized since your last visit? No    2. Have you seen or consulted any other health care providers outside of the [address] [place] [address] since your last visit? Include any pap smears or colon screening. No      Would you like to sign up for MyChart today, if you have not already done so? Patient has a mychart  If not, would you like information on MyChart, and how to sign up at a later time? No      Medication reconciliation up to date and corrected with patient at this time. Today's provider has been notified of reason for visit, vitals and flowsheets obtained on patients. Reviewed record in preparation for visit, huddled with provider and have obtained necessary documentation.       Health Maintenance Due   Topic    GLAUCOMA SCREENING Q2Y     COLONOSCOPY     MEDICARE YEARLY EXAM        Wt Readings from Last 3 Encounters:   19 162 lb 6.4 oz (73.7 kg)   19 163 lb 14.4 oz (74.3 kg)   19 163 lb 6.4 oz (74.1 kg)     Temp Readings from Last 3 Encounters:   19 97.2 °F (36.2 °C) (Oral)   19 97 °F (36.1 °C) (Oral)   19 97.1 °F (36.2 °C) (Oral)     BP Readings from Last 3 Encounters:   19 154/87   19 136/80   19 163/82     Pulse Readings from Last 3 Encounters:   19 77   19 70   19 71     Vitals:    19 1458   BP: 154/87   Pulse: 77   Resp: 20   Temp: 97.2 °F (36.2 °C)   TempSrc: Oral   SpO2: 94%   Weight: 162 lb 6.4 oz (73.7 kg)   Height: 5' 2.5\" (1.588 m)   PainSc:   0 - No pain         Learning Assessment:  :     Learning Assessment 2018   PRIMARY LEARNER Patient Patient   HIGHEST LEVEL OF EDUCATION - PRIMARY LEARNER  - GRADUATED HIGH SCHOOL OR GED   BARRIERS PRIMARY LEARNER - NONE   CO-LEARNER CAREGIVER - No   PRIMARY LANGUAGE ENGLISH ENGLISH   LEARNER PREFERENCE PRIMARY VIDEOS READING   ANSWERED BY patient patient   RELATIONSHIP SELF SELF       Depression Screening:  :     3 most recent PHQ Screens 9/30/2019   Little interest or pleasure in doing things Not at all   Feeling down, depressed, irritable, or hopeless Not at all   Total Score PHQ 2 0       Fall Risk Assessment:  :     Fall Risk Assessment, last 12 mths 9/30/2019   Able to walk? Yes   Fall in past 12 months? No       Abuse Screening:  :     Abuse Screening Questionnaire 9/30/2019 4/16/2019 9/24/2018 6/18/2018 1/20/2016 7/9/2014   Do you ever feel afraid of your partner? N N N N N N   Are you in a relationship with someone who physically or mentally threatens you? N N N N N N   Is it safe for you to go home? Y Y Y Y Y Y       ADL Screening:  :     ADL Assessment 9/30/2019   Feeding yourself No Help Needed   Getting from bed to chair No Help Needed   Getting dressed No Help Needed   Bathing or showering No Help Needed   Walk across the room (includes cane/walker) No Help Needed   Using the telphone No Help Needed   Taking your medications No Help Needed   Preparing meals No Help Needed   Managing money (expenses/bills) No Help Needed   Moderately strenuous housework (laundry) No Help Needed   Shopping for personal items (toiletries/medicines) No Help Needed   Shopping for groceries No Help Needed   Driving No Help Needed   Climbing a flight of stairs No Help Needed   Getting to places beyond walking distances No Help Needed     Patient presents for Controlled Substance Prescription follow up.     Last prescription:  diazePAM (VALIUM) 5 mg tablet [[id number]]     Order Details   Dose, Route, Frequency: As Directed    Indications of Use: anxiety   Dispense Quantity: 60 Tab Refills: 2 Fills remaining: --           Sig: Take full tablet in the am and 1/2 as needed mid day and at bedtime for anxiety  Indications: anxious     Written Date: 07/12/19 Expiration Date: --     Start Date: 07/12/19 End Date: --            Ordering Provider: -- CARLOS #:  -- NPI:  --    Authorizing Provider: Jeronimo Corea MD CARLOS #:  [id number] NPI:  [id number]    Ordering User:  Jeronimo Corea MD            Diagnosis Association: Anxiety (F41.9)      Original Order:  diazePAM (VALIUM) 5 mg tablet [[id number]]      Pharmacy:  [address] [place] [address], [address] [place] [address] Rd #:  --     Pharmacy Comments:  --          Fill quantity remaining:  -- Fill quantity used:  -- Next fill due: --       Lab Test Results     Component Date/Time Value Range & Unit Status   AST (SGOT) 08/22/19 0848 17 0 - 40 IU/L Final   Priority and Order Details     Priority Class    Routine Print    Warnings History     Total number of overridden warnings: 1   Full Warnings History   Pharmacy     [address], [address]   [facility]   Outpatient Medication Detail      Disp Refills Start End    diazePAM (VALIUM) 5 mg tablet 60 Tab 2 7/12/2019     Sig: Take full tablet in the am and 1/2 as needed mid day and at bedtime for anxiety  Indications: anxious    Class: Print    Tracking Reports      Cosign Tracking Order Transmittal Tracking     Bottle matches last prescription. 24 pills remaining in bottle. Patient reports last dose taken at 1130 am on September 30   printed. Urine collected. Controlled Substance Agreement letter printed.

## 2019-09-30 NOTE — LETTER
Porfirio Knight GSB:[birth year] MR #:[id number] Provider Name:Mildred Andrade MD  
*QKDZ-902* BSMG-491 (5/16) Page 1 of 5 Initial [place] CONTROLLED SUBSTANCE AGREEMENT I may be prescribed medications that are controlled substances as part  of my treatment plan for management of my medical condition(s). The goal of my treatment plan is to maintain and/or improve my health and wellbeing. Because controlled substances have an increased risk of abuse or harm, continual re-evaluation is needed determine if the goals of my treatment plan are being met for my safety and the safety of others. Spencer Stuart  am entering into this Controlled Substance Agreement with my provider, Terence Hernandez MD at [facility] . I understand that successful treatment requires mutual trust and honesty between me and my provider. I understand that there are state and federal laws and regulations which apply to the medications that my provider may prescribe that must be followed. I understand there are risks and benefits ts of taking the medicines that my provider may prescribe. I understand and agree that following this Agreement is necessary in continuing my provider-patient relationship and success of my treatment plan. As a part of my treatment plan, I agree to the following: COMMUNICATION: 
 
1. I will communicate fully with my provider about my medical condition(s), including the effect on my daily life and how well my medications are helping. I will tell my provider all of the medications that I take for any reason, including medications I receive from another health care provider, and will notify my provider about all issues, problems or concerns, including any side effects, which may be related to my medications. I understand that this information allows my provider to adjust my treatment plan to help manage my medical condition.  I understand that this information will become part of my permanent medical record. 2. I will notify my provider if I have a history of alcohol/drug misuse/addiction or if I have had treatment for alcohol/drug addiction in the past, or if I have a new problem with or concern about alcohol/drug use/addiction, because this increases the likelihood of high risk behaviors and may lead to serious medical conditions. 3. Females Only: I will notify my provider if I am or become pregnant, or if I intend to become pregnant, or if I intend to breastfeed. I understand that communication of these issues with my provider is important, due to possible effects my medication could have on an unborn fetus or breastfeeding child. Willie Agarwal SSR:[birth year] MR #:[id number] Provider Name:Maya Andrade MD  
*EZHO-189* BSMG-491 (5/16) Page 2 of 5 Initial [employer] MISUSE OF MEDICATIONS / DRUGS: 
 
1. I agree to take all controlled substances as prescribed, and will not misuse or abuse any controlled substances prescribed by my provider. For my safety, I will not increase the amount of medicine I take without first talking with and getting permission from my provider. 2. If I have a medical emergency, another health care provider may prescribe me medication. If I seek emergency treatment, I will notify my provider within seventy-two (72) hours. 3. I understand that my provider may discuss my use and/or possible misuse/abuse of controlled substances and alcohol, as appropriate, with any health care provider involved in my care, pharmacist or legal authority. ILLEGAL DRUGS: 
 
1. I will not use illegal drugs of any kind, including but not limited to marijuana, heroin, cocaine, or any prescription drug which is not prescribed to me. DRUG DIVERSION / PRESCRIPTION FRAUD: 
 
1. I will not share, sell, trade, give away, or otherwise misuse my prescriptions or medications. 2. I will not alter any prescriptions provided to me by my provider. SINGLE PROVIDER: 
 
1. I agree that all controlled substances that I take will be prescribed only by my provider (or his/her covering provider) under this Agreement. This agreement does not prevent me from seeking emergency medical treatment or receiving pain management related to a surgery. PROTECTING MEDICATIONS: 
 
1. I am responsible for keeping my prescriptions and medications in a safe and secure place including safeguarding them from loss or theft. I understand that lost, stolen or damaged/destroyed prescriptions or medications will not be replaced. Maria Eugenia Barboza VBN:[birth year] MR #:[id number] Provider Name:Conner Andrade MD  
*UXXA-912* BSMG-491 (5/16) Page 3 of 5 Initial [email or web address] PRESCRIPTION RENEWALS/REFILLS: 
 
1. I will follow my controlled substance medication schedule as prescribed by my provider. 2. I understand and agree that I will make any requests for renewals or refills of my prescriptions only at the time of an office visit or during my providers regular office hours subject to the prescription refill requirements of the individual practice. 3. I understand that my provider may not call in prescriptions for controlled substances to my pharmacy. 4. I understand that my provider may adjust or discontinue these medications as deemed appropriate for my medical treatment plan. This Agreement does not guarantee the prescription of controlled medications. 5. I agree that if my medications are adjusted or discontinued, I will properly dispose of any remaining medications. I understand that I will be required to dispose of any remaining controlled medications prior to being provided with any prescriptions for other controlled medications.  
 
 
1. I authorize my provider and my pharmacy to cooperate fully with any local, state, or federal law enforcement agency in the investigation of any possible misuse, sale, or other diversion of my controlled substance prescriptions or medications. RISKS: 
 
 
1. I understand that if I do not adhere to this Agreement in any way, my provider may change my prescriptions, stop prescribing controlled substances or end our provider-patient relationship. 2. If my provider decides to stop prescribing medication, or decides to end our provider-patient relationship,my provider may require that I taper my medications slowly. If necessary, my provider may also provide a prescription for other medications to treat my withdrawal symptoms. UNDERSTANDING THIS AGREEMENT: 
 
I understand that my provider may adjust or stop my prescriptions for controlled substances based on my medical condition and my treatment plan. I understand that this Agreement does not guarantee that I will be prescribed medications or controlled substances. I understand that controlled substances may be just one part 
of my treatment plan.  
 
My initial on each page and my signature below shows that I have read each page of this Agreement, I have had an opportunity to ask questions, and all of my questions have been answered to my satisfaction by my provider. By signing below, I agree to comply with this Agreement, and I understand that if I do not follow the Agreements listed above, my provider may stop 
 
 
 
_________________________________________  Date/Time 9/30/2019 3:18 PM   
             (Patient Signature)

## 2019-10-01 RX ORDER — LEVOTHYROXINE SODIUM 25 UG/1
TABLET ORAL
Qty: 90 TAB | Refills: 0 | Status: SHIPPED | OUTPATIENT
Start: 2019-10-01 | End: 2019-12-29

## 2019-11-19 DIAGNOSIS — F41.9 ANXIETY: Chronic | ICD-10-CM

## 2019-11-19 DIAGNOSIS — R03.0 ELEVATED BLOOD PRESSURE READING WITHOUT DIAGNOSIS OF HYPERTENSION: ICD-10-CM

## 2019-11-19 DIAGNOSIS — R79.89 ABNORMAL TSH: Primary | ICD-10-CM

## 2019-11-21 LAB — TSH SERPL DL<=0.005 MIU/L-ACNC: 2.17 UIU/ML (ref 0.45–4.5)

## 2019-12-28 DIAGNOSIS — R79.89 ABNORMAL TSH: ICD-10-CM

## 2019-12-29 RX ORDER — LEVOTHYROXINE SODIUM 25 UG/1
TABLET ORAL
Qty: 90 TAB | Refills: 0 | Status: SHIPPED | OUTPATIENT
Start: 2019-12-29 | End: 2020-03-26

## 2020-01-09 ENCOUNTER — OFFICE VISIT (OUTPATIENT)
Dept: FAMILY MEDICINE CLINIC | Age: 80
End: 2020-01-09

## 2020-01-09 VITALS
OXYGEN SATURATION: 95 % | HEART RATE: 77 BPM | WEIGHT: 160 LBS | HEIGHT: 63 IN | DIASTOLIC BLOOD PRESSURE: 68 MMHG | SYSTOLIC BLOOD PRESSURE: 130 MMHG | TEMPERATURE: 98 F | BODY MASS INDEX: 28.35 KG/M2 | RESPIRATION RATE: 18 BRPM

## 2020-01-09 DIAGNOSIS — F41.9 ANXIETY: Chronic | ICD-10-CM

## 2020-01-09 RX ORDER — DIAZEPAM 5 MG/1
TABLET ORAL
Qty: 60 TAB | Refills: 2 | Status: SHIPPED | OUTPATIENT
Start: 2020-01-09 | End: 2020-04-09

## 2020-01-09 NOTE — PROGRESS NOTES
Michele Espinal is a 78 y.o. female  HIPAA verified by two patient identifiers. Chief Complaint   Patient presents with    Medication Refill     Visit Vitals  /68 (BP 1 Location: Right arm, BP Patient Position: Sitting)   Pulse 77   Temp 98 °F (36.7 °C)   Resp 18   Ht 5' 2.5\" (1.588 m)   Wt 160 lb (72.6 kg)   LMP  (LMP Unknown)   SpO2 95%   BMI 28.80 kg/m²       Pain Scale: 0 - No pain/10  Pain Location:   1. Have you been to the ER, urgent care clinic since your last visit? Hospitalized since your last visit? No    2. Have you seen or consulted any other health care providers outside of the [address] [place] [address] since your last visit? Include any pap smears or colon screening.  No

## 2020-01-09 NOTE — PROGRESS NOTES
Here for controlled med refill for anxiety.  reviewed. Patient denies chest pain, dyspnea, unexpected weight change, unexpected pain, mood or memory changes. Visit Vitals  /68 (BP 1 Location: Right arm, BP Patient Position: Sitting)   Pulse 77   Temp 98 °F (36.7 °C)   Resp 18   Ht 5' 2.5\" (1.588 m)   Wt 160 lb (72.6 kg)   LMP  (LMP Unknown)   SpO2 95%   BMI 28.80 kg/m²     Patient alert and cooperative. Reviewed above. Assessment:  1. Chronic anxiety, on chronic Valium. Plan:  1. Refilled three months. 2. Follow up through [address] for subsequent refills. 3. Wellness due end of September, annual labs prior. 4. Follow otherwise here prn.

## 2020-03-05 ENCOUNTER — OFFICE VISIT (OUTPATIENT)
Dept: FAMILY MEDICINE CLINIC | Age: 80
End: 2020-03-05

## 2020-03-05 VITALS
RESPIRATION RATE: 18 BRPM | HEIGHT: 63 IN | OXYGEN SATURATION: 94 % | SYSTOLIC BLOOD PRESSURE: 146 MMHG | TEMPERATURE: 98.7 F | WEIGHT: 163 LBS | BODY MASS INDEX: 28.88 KG/M2 | HEART RATE: 83 BPM | DIASTOLIC BLOOD PRESSURE: 73 MMHG

## 2020-03-05 DIAGNOSIS — R68.89 FLU-LIKE SYMPTOMS: Primary | ICD-10-CM

## 2020-03-05 DIAGNOSIS — B34.9 VIRAL SYNDROME: ICD-10-CM

## 2020-03-05 LAB
QUICKVUE INFLUENZA TEST: NEGATIVE
VALID INTERNAL CONTROL?: YES

## 2020-03-05 NOTE — PROGRESS NOTES
Mercedes Ruvalcaba is a 78 y.o. female  HIPAA verified by two patient identifiers. Health Maintenance Due   Topic    Shingrix Vaccine Age 50> (1 of 2)   [facility] GLAUCOMA SCREENING Q2Y      Chief Complaint   Patient presents with    Cold Symptoms     Visit Vitals  /73 (BP 1 Location: Left arm, BP Patient Position: Sitting)   Pulse 83   Temp 98.7 °F (37.1 °C)   Resp 18   Ht 5' 2.5\" (1.588 m)   Wt 163 lb (73.9 kg)   LMP  (LMP Unknown)   SpO2 94%   BMI 29.34 kg/m²       Pain Scale: 5/10  Pain Location: Leg (bilateral)  1. Have you been to the ER, urgent care clinic since your last visit? Hospitalized since your last visit? No    2. Have you seen or consulted any other health care providers outside of the [address] [place] [address] since your last visit? Include any pap smears or colon screening.  No

## 2020-03-05 NOTE — PROGRESS NOTES
Nausea, achy. No HA, ST, cough. No chills, sweats. No flu shot. Nosebleed last night. No known illness exposure.  with discolored mucus past 2 weeks. Patient denies chest pain, dyspnea, unexpected weight change, unexpected pain, mood or memory changes. Visit Vitals  /73 (BP 1 Location: Left arm, BP Patient Position: Sitting)   Pulse 83   Temp 98.7 °F (37.1 °C)   Resp 18   Ht 5' 2.5\" (1.588 m)   Wt 163 lb (73.9 kg)   LMP  (LMP Unknown)   SpO2 94%   BMI 29.34 kg/m²     Patient alert and cooperative. Lungs clear. Abdomen soft, NT. Assessment:  1. Viral syndrome, negative flu test.    Plan:  1. Call if spikes fever, purulence. 2. Recheck here otherwise prn.

## 2020-03-26 DIAGNOSIS — R79.89 ABNORMAL TSH: ICD-10-CM

## 2020-03-26 RX ORDER — LEVOTHYROXINE SODIUM 25 UG/1
TABLET ORAL
Qty: 30 TAB | Refills: 0 | Status: SHIPPED | OUTPATIENT
Start: 2020-03-26 | End: 2020-03-26

## 2020-03-26 RX ORDER — LEVOTHYROXINE SODIUM 25 UG/1
TABLET ORAL
Qty: 90 TAB | Refills: 0 | Status: SHIPPED | OUTPATIENT
Start: 2020-03-26 | End: 2020-06-24

## 2020-03-26 NOTE — TELEPHONE ENCOUNTER
Called patient to tell that Dr Ava Dan has refilled her levothyroxine 25 mcg tablet   refilled for 90 days with no refills. You will need to make an appointment with Dr. vAa Dan before these 90 pills run out for lab work to be done. She stated she will.

## 2020-07-20 ENCOUNTER — VIRTUAL VISIT (OUTPATIENT)
Dept: FAMILY MEDICINE CLINIC | Age: 80
End: 2020-07-20

## 2020-07-20 DIAGNOSIS — F41.9 ANXIETY: Chronic | ICD-10-CM

## 2020-07-20 RX ORDER — DIAZEPAM 5 MG/1
TABLET ORAL
Qty: 180 TAB | Refills: 0 | Status: SHIPPED | OUTPATIENT
Start: 2020-07-20 | End: 2020-10-12 | Stop reason: SDUPTHER

## 2020-07-20 NOTE — PROGRESS NOTES
Sahra Gibson is a 78 y.o. female, evaluated via audio-only technology on 7/20/2020 for Medication Refill  . Assessment & Plan:   Diagnoses and all orders for this visit:    1. Anxiety  -     diazePAM (VALIUM) 5 mg tablet; TAKE FULL TABLET IN THE MORNING AND 1/2 TABLET AS NEEDED MID DAY AND AT BEDTIME FOR ANXIETY- ANXIOUS      The complexity of medical decision making for this visit is moderate         12  Subjective:     Needs controlled med refill for anxiety. Staying at home. Getting upper plate done. Prior to Admission medications    Medication Sig Start Date End Date Taking? Authorizing Provider   levothyroxine (SYNTHROID) 25 mcg tablet TAKE 1 TABLET BY MOUTH DAILY BEFORE BREAKFAST 6/24/20  Yes Sherry Andrade MD   diazePAM (VALIUM) 5 mg tablet TAKE FULL TABLET IN THE MORNING AND 1/2 TABLET AS NEEDED MID DAY AND AT BEDTIME FOR ANXIETY- ANXIOUS 4/9/20  Yes Sherry Andrade MD   atorvastatin (LIPITOR) 10 mg tablet TAKE 1 TABLET EVERY DAY 9/11/19  Yes Sherry Andrade MD   magnesium oxide (MAG-OX) 400 mg tablet Take 400 mg by mouth daily. Yes Provider, Historical   cholecalciferol (VITAMIN D3) 1,000 unit tablet Take  by mouth daily. Yes Provider, Historical   aspirin delayed-release 81 mg tablet Take 81 mg by mouth daily. Yes Provider, Historical   Potassium Gluconate 595 mg (99 mg) tablet  4/17/18   Provider, Historical   cyanocobalamin 1,000 mcg tablet Take 1,000 mcg by mouth daily.     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  cholecalciferol (VITAMIN D3) 1,000 unit tablet Take  by mouth daily.  aspirin delayed-release 81 mg tablet Take 81 mg by mouth daily.  Potassium Gluconate 595 mg (99 mg) tablet       cyanocobalamin 1,000 mcg tablet Take 1,000 mcg by mouth daily.        Allergies   Allergen Reactions    Ciprofloxacin Nausea Only     Past Medical History:   Diagnosis Date    Advance directive discussed with patient 03/23/2016    Dysuria 5/9/16    [facility] notes  US report rec'd     Encounter for gynecological examination 5/19/16    notes from [address] [place] [address] Rd CASTILLO (generalized anxiety disorder)     Hearing loss in left ear     Murmur, cardiac     echocardiogram report from Dr Ferne Fleischer  EF 65-70%  1/23/17 note and ekg    Myalgia     Pure hypercholesterolemia 11/14/2009    Screening for glaucoma 07/14/2016    Dr Pancho Love note rec'd    Unexplained weight loss 6/6/16    GI spec Dr Rochelle Anguiano note rec'd     Past Surgical History:   Procedure Laterality Date    ENDOSCOPY, COLON, DIAGNOSTIC  2/2005    ad polyp; 3 year repeat ; [facility] ENDOSCOPY, COLON, DIAGNOSTIC  3/2008    neg; 5 year repeat; Rochelle Anguiano    HX TUBAL LIGATION      MO COLONOSCOPY FLX DX W/COLLJ [address] [facility] [address]  11/21/2013; 2008; 2003          Family History   Problem Relation Age of Onset    Dementia Mother     Other Mother         Vitamin B12 deficiency/Pernicious anemia    Congenital heart defect Father     Cancer Brother         Prostate CA    Stroke Brother     Cancer Daughter         Non-Hodgkin's lymphoma    Breast Cancer Maternal Aunt 54     Social History     Tobacco Use    Smoking status: Former Smoker     Packs/day: 0.50     Years: 20.00     Pack years: 10.00     Types: Cigarettes     Last attempt to quit: 1973     Years since quittin.6    Smokeless tobacco: Never Used   Substance Use Topics    Alcohol use: Yes     Comment: very little (2-3x/month)       ROS    Patient-Reported Vitals 2020   Patient-Reported Weight 150lb   Patient-Reported Height [address], who was evaluated through a patient-initiated, synchronous (real-time) audio only encounter, and/or her healthcare decision maker, is aware that it is a billable service, with coverage as determined by her insurance carrier. She provided verbal consent to proceed: Yes. She has not had a related appointment within my department in the past 7 days or scheduled within the next 24 hours.       Total Time: minutes: 11-20 minutes    Akosua Coronado MD

## 2020-07-20 NOTE — PROGRESS NOTES
Astrid Weathers is a 78 y.o. female  HIPAA verified by two patient identifiers. Health Maintenance Due   Topic    Shingrix Vaccine Age 49> (1 of 2)    Pneumococcal 65+ years (1 of 1 - PPSV23)   [facility] GLAUCOMA SCREENING Q2Y      Chief Complaint   Patient presents with    Medication Refill     Patient-Reported Vitals 7/20/2020   Patient-Reported Weight 150lb   Patient-Reported Height 5'5       Pain Scale:0   /10  Pain Location:             1. Have you been to the ER, urgent care clinic since your last visit? Hospitalized since your last visit? No    2. Have you seen or consulted any other health care providers outside of the [address] [place] [address] since your last visit? Include any pap smears or colon screening.  No    Phone call only# [phone]

## 2020-08-13 ENCOUNTER — VIRTUAL VISIT (OUTPATIENT)
Dept: FAMILY MEDICINE CLINIC | Age: 80
End: 2020-08-13
Payer: MEDICARE

## 2020-08-13 DIAGNOSIS — F41.9 ANXIETY: Chronic | ICD-10-CM

## 2020-08-13 PROCEDURE — 99442 PR PHYS/QHP TELEPHONE EVALUATION 11-20 MIN: CPT | Performed by: FAMILY MEDICINE

## 2020-08-13 NOTE — PROGRESS NOTES
Debbi Blandon is a 78 y.o. female, evaluated via audio-only technology on 8/13/2020 for No chief complaint on file. .    Assessment & Plan:   Diagnoses and all orders for this visit:    1. Anxiety      The complexity of medical decision making for this visit is moderate         12  Subjective:     Discussed controlled med script. Only got #60 when #180 were ordered. Takes 1 tab BID and 1/2 tab midday if needed. Spoke with pharmacist.  Ins only allows 1 month at a time. Changed script to reflect increased amount needed to #75/1. Prior to Admission medications    Medication Sig Start Date End Date Taking? Authorizing Provider   diazePAM (VALIUM) 5 mg tablet TAKE FULL TABLET IN THE MORNING AND 1/2 TABLET AS NEEDED MID DAY AND AT BEDTIME FOR ANXIETY- ANXIOUS 7/20/20   Chanel Andrade MD   levothyroxine (SYNTHROID) 25 mcg tablet TAKE 1 TABLET BY MOUTH DAILY BEFORE BREAKFAST 6/24/20   Chanel Andrade MD   atorvastatin (LIPITOR) 10 mg tablet TAKE 1 TABLET EVERY DAY 9/11/19   Chanel Andrade MD   Potassium Gluconate 595 mg (99 mg) tablet  4/17/18   Provider, Historical   cyanocobalamin 1,000 mcg tablet Take 1,000 mcg by mouth daily. Provider, Historical   magnesium oxide (MAG-OX) 400 mg tablet Take 400 mg by mouth daily. Provider, Historical   cholecalciferol (VITAMIN D3) 1,000 unit tablet Take  by mouth daily. Provider, Historical   aspirin delayed-release 81 mg tablet Take 81 mg by mouth daily.     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.91    DJD (degenerative joint disease) of knee M17.10    Elevated blood pressure reading without diagnosis of hypertension R03.0    Abnormal TSH R79.89     Current Outpatient Medications   Medication Sig Dispense Refill    diazePAM (VALIUM) 5 mg tablet TAKE FULL TABLET IN THE MORNING AND 1/2 TABLET AS NEEDED MID DAY AND AT BEDTIME FOR ANXIETY- ANXIOUS 180 Tab 0    levothyroxine (SYNTHROID) 25 mcg tablet TAKE 1 TABLET BY MOUTH DAILY BEFORE BREAKFAST 90 Tab 0    atorvastatin (LIPITOR) 10 mg tablet TAKE 1 TABLET EVERY DAY 90 Tab 3    Potassium Gluconate 595 mg (99 mg) tablet       cyanocobalamin 1,000 mcg tablet Take 1,000 mcg by mouth daily.  magnesium oxide (MAG-OX) 400 mg tablet Take 400 mg by mouth daily.  cholecalciferol (VITAMIN D3) 1,000 unit tablet Take  by mouth daily.  aspirin delayed-release 81 mg tablet Take 81 mg by mouth daily.        Allergies   Allergen Reactions    Ciprofloxacin Nausea Only     Past Medical History:   Diagnosis Date    Advance directive discussed with patient 03/23/2016    Dysuria 5/9/16    [facility]  [facility] report rec'd     Encounter for gynecological examination 5/19/16    notes from [facility] [address] [place] (generalized anxiety disorder)     Hearing loss in left ear     Murmur, cardiac     echocardiogram report from Dr Amanda Asencio  EF 65-70%  1/23/17 note and ekg    Myalgia     Pure hypercholesterolemia 11/14/2009    Screening for glaucoma 07/14/2016    Dr Som Cali note rec'd    Unexplained weight loss 6/6/16    GI spec Dr Zuleima Valentine note rec'd     Past Surgical History:   Procedure Laterality Date    ENDOSCOPY, COLON, DIAGNOSTIC  2/2005    ad polyp; 3 year repeat ; Octavio Johnson ENDOSCOPY, COLON, DIAGNOSTIC  3/2008    neg; 5 year repeat; Zuleima Valentine    HX TUBAL LIGATION      [place] COLONOSCOPY FLX DX W/COLLJ [facility] WHEN PFRMD  11/21/2013; 2008; 2003          Family History   Problem Relation Age of Onset    Dementia Mother     Other Mother         Vitamin B12 deficiency/Pernicious anemia    Congenital heart defect Father     Cancer Brother         Prostate CA    Stroke Brother     Cancer Daughter         Non-Hodgkin's lymphoma    Breast Cancer Maternal Aunt 54     Social History     Tobacco Use    Smoking status: Former Smoker     Packs/day: 0.50     Years: 20.00     Pack years: 10.00     Types: Cigarettes     Last attempt to quit: 1973     Years since quittin.7    Smokeless tobacco: Never Used   Substance Use Topics    Alcohol use: Yes     Comment: very little (2-3x/month)       ROS    Patient-Reported Vitals 2020   Patient-Reported Weight 150lb   Patient-Reported Height [address] [place], who was evaluated through a patient-initiated, synchronous (real-time) audio only encounter, and/or her healthcare decision maker, is aware that it is a billable service, with coverage as determined by her insurance carrier. She provided verbal consent to proceed: Yes. She has not had a related appointment within my department in the past 7 days or scheduled within the next 24 hours.       Total Time: minutes: 11-20 minutes    Murriel Najjar, MD

## 2020-08-13 NOTE — PROGRESS NOTES
No chief complaint on file.       Patient-Reported Vitals 7/20/2020   Patient-Reported Weight 150lb   Patient-Reported Height 5'5       Pain Scale: /10  Pain Location:

## 2020-10-12 DIAGNOSIS — F41.9 ANXIETY: Chronic | ICD-10-CM

## 2020-10-12 RX ORDER — DIAZEPAM 5 MG/1
TABLET ORAL
Qty: 43 TAB | Refills: 0 | Status: SHIPPED | OUTPATIENT
Start: 2020-10-12 | End: 2020-10-28 | Stop reason: SDUPTHER

## 2020-10-12 NOTE — TELEPHONE ENCOUNTER
The patient is concerned about her medication Valuim 5 mg tablets. The patient said she will run out of tablets before her appointment with Chelo on 10/28/2020 at 9:00am. Please call patient if medications cannot be filled. There were no sooner appointments available. Please call [phone]

## 2020-10-28 ENCOUNTER — VIRTUAL VISIT (OUTPATIENT)
Dept: FAMILY MEDICINE CLINIC | Age: 80
End: 2020-10-28
Payer: MEDICARE

## 2020-10-28 ENCOUNTER — TELEPHONE (OUTPATIENT)
Dept: FAMILY MEDICINE CLINIC | Age: 80
End: 2020-10-28

## 2020-10-28 DIAGNOSIS — F41.9 ANXIETY: ICD-10-CM

## 2020-10-28 DIAGNOSIS — E78.00 PURE HYPERCHOLESTEROLEMIA: Primary | ICD-10-CM

## 2020-10-28 DIAGNOSIS — E03.9 ACQUIRED HYPOTHYROIDISM: ICD-10-CM

## 2020-10-28 PROCEDURE — 99442 PR PHYS/QHP TELEPHONE EVALUATION 11-20 MIN: CPT | Performed by: PHYSICIAN ASSISTANT

## 2020-10-28 RX ORDER — DIAZEPAM 5 MG/1
TABLET ORAL
Qty: 75 TAB | Refills: 0 | Status: SHIPPED | OUTPATIENT
Start: 2020-11-09 | End: 2020-12-20

## 2020-10-28 RX ORDER — ATORVASTATIN CALCIUM 10 MG/1
TABLET, FILM COATED ORAL
Qty: 90 TAB | Refills: 1 | Status: SHIPPED | OUTPATIENT
Start: 2020-10-28 | End: 2021-04-13

## 2020-10-28 RX ORDER — LEVOTHYROXINE SODIUM 25 UG/1
TABLET ORAL
Qty: 90 TAB | Refills: 1 | Status: SHIPPED | OUTPATIENT
Start: 2020-10-28 | End: 2020-12-21

## 2020-10-28 NOTE — PROGRESS NOTES
Camron Matute is a 78 y.o. female    HIPAA verified by two patient identifiers. Chief Complaint   Patient presents with    Medication Refill     Atorvastatin, Levothyroxine is to be sent to [facility] and Diazepam is to be sent to [facility]. 1. Have you been to the ER, urgent care clinic since your last visit? Hospitalized since your last visit? No    2. Have you seen or consulted any other health care providers outside of the [address] [place] [address] since your last visit? Include any pap smears or colon screening. No    Pt reported no vitals.      Telephone call: [phone]

## 2020-10-28 NOTE — TELEPHONE ENCOUNTER
Called pharmacy to cancel pts Diazepam that was sent over on 10/12/2020. Pharmacist verified understanding. Advised that there will be another rx for the same prescription sent over electronically.

## 2020-10-28 NOTE — PROGRESS NOTES
Dyllan Christian is a 78 y.o. female, evaluated via audio-only technology on 10/28/2020 for Medication Refill (Atorvastatin, Levothyroxine is to be sent to [facility] and Diazepam is to be sent to [place]. )  . Assessment & Plan:   Diagnoses and all orders for this visit:    1. Pure hypercholesterolemia  -     atorvastatin (LIPITOR) 10 mg tablet; TAKE 1 TABLET EVERY DAY.  -     METABOLIC PANEL, COMPREHENSIVE; Future  -     CBC WITH AUTOMATED DIFF; Future  -     TSH 3RD GENERATION; Future  -     LIPID PANEL; Future  -     URINALYSIS W/ REFLEX CULTURE; Future    2. Acquired hypothyroidism  -     levothyroxine (SYNTHROID) 25 mcg tablet; TAKE 1 TABLET BY MOUTH DAILY BEFORE BREAKFAST  -     METABOLIC PANEL, COMPREHENSIVE; Future  -     CBC WITH AUTOMATED DIFF; Future  -     TSH 3RD GENERATION; Future  -     LIPID PANEL; Future  -     URINALYSIS W/ REFLEX CULTURE; Future    3. Anxiety  -     diazePAM (VALIUM) 5 mg tablet; TAKE 1 FULL TABLET IN THE MORNING AND AT BEDTIME, and 1/2 TAB MID-DAY FOR ANXIETY- ANXIOUS. Not to fill before 11/09/20. Indications: anxious  -     METABOLIC PANEL, COMPREHENSIVE; Future  -     CBC WITH AUTOMATED DIFF; Future  -     TSH 3RD GENERATION; Future  -     LIPID PANEL;  Future  -     URINALYSIS W/ REFLEX CULTURE; Future      F/U with PCP Dr Sai Baker in December before he retires    12  Subjective:     PCP is Dr Sai Baker, who is out on leave  Needs refills, labs due, has appt for lab visit in Nov 19th but orders aren't in yet    Hypothyroidism - needs refill sent to [facility] mail order  Lab Results   Component Value Date/Time    TSH 2.170 11/20/2019 09:09 AM     HLD - on atorvastatin 10 mg daily ([facility] mail order)  Lab Results   Component Value Date/Time    Cholesterol, total 182 08/22/2019 08:48 AM    HDL Cholesterol 55 08/22/2019 08:48 AM    LDL, calculated 107 (H) 08/22/2019 08:48 AM    VLDL, calculated 20 08/22/2019 08:48 AM    Triglyceride 99 08/22/2019 08:48 AM    CHOL/HDL Ratio 3.0 11/05/2009 11:44 AM     Anxiety - diazepam 1 tab BID and 1/2 tab mid-day, last filled RX on 10/12/20, refill due around 11/12/20 [facility])   reviewed and appropriate  I had approved a refill for 2 weeks as a bridge RX for this appt on 10/12/20 for #43, but pt states she did not need it since back in Sept she had dental surgery, was on a pain medication such that she did not take her usual diazepam during that time, and therefore had enough on hand to last since her last refill was 10/12/20 from Dr Amilcar Christian. Controlled substance agreement on file 11/22/2019    My MA called pt's pharmacy to cancel the diazepam I ordered on 10/12/20 for #43. Prior to Admission medications    Medication Sig Start Date End Date Taking? Authorizing Provider   diazePAM (VALIUM) 5 mg tablet TAKE FULL TABLET IN THE MORNING AND 1/2 TABLET AS NEEDED MID DAY AND AT BEDTIME FOR ANXIETY- ANXIOUS  Indications: anxious 10/12/20 10/28/20 Yes Chelo Dukes PA-C   levothyroxine (SYNTHROID) 25 mcg tablet TAKE 1 TABLET BY MOUTH DAILY BEFORE BREAKFAST 9/24/20  Yes Jeannie Ceballos,    atorvastatin (LIPITOR) 10 mg tablet TAKE 1 TABLET EVERY DAY. Office visit and labs due before next refill 9/24/20  Yes Chelo Dukes PA-C   Potassium Gluconate 595 mg (99 mg) tablet  4/17/18  Yes Provider, Historical   cyanocobalamin 1,000 mcg tablet Take 1,000 mcg by mouth daily. Yes Provider, Historical   magnesium oxide (MAG-OX) 400 mg tablet Take 400 mg by mouth daily. Yes Provider, Historical   cholecalciferol (VITAMIN D3) 1,000 unit tablet Take  by mouth daily. Yes Provider, Historical   aspirin delayed-release 81 mg tablet Take 81 mg by mouth daily.    Yes Provider, Historical     Patient Active Problem List    Diagnosis Date Noted    Abnormal TSH 09/30/2019    Elevated blood pressure reading without diagnosis of hypertension 01/14/2015    DJD (degenerative joint disease) of knee 12/12/2012    Hearing loss in right ear 12/13/2010    Pure hypercholesterolemia 11/14/2009    Anxiety 11/14/2009     Current Outpatient Medications   Medication Sig Dispense Refill    levothyroxine (SYNTHROID) 25 mcg tablet TAKE 1 TABLET BY MOUTH DAILY BEFORE BREAKFAST 90 Tab 1    atorvastatin (LIPITOR) 10 mg tablet TAKE 1 TABLET EVERY DAY. 90 Tab 1    [START ON 11/9/2020] diazePAM (VALIUM) 5 mg tablet TAKE 1 FULL TABLET IN THE MORNING AND AT BEDTIME, and 1/2 TAB MID-DAY FOR ANXIETY- ANXIOUS. Not to fill before 11/09/20. Indications: anxious 75 Tab 0    Potassium Gluconate 595 mg (99 mg) tablet       cyanocobalamin 1,000 mcg tablet Take 1,000 mcg by mouth daily.  magnesium oxide (MAG-OX) 400 mg tablet Take 400 mg by mouth daily.  cholecalciferol (VITAMIN D3) 1,000 unit tablet Take  by mouth daily.  aspirin delayed-release 81 mg tablet Take 81 mg by mouth daily.        Allergies   Allergen Reactions    Ciprofloxacin Nausea Only     Past Medical History:   Diagnosis Date    Advance directive discussed with patient 03/23/2016    Dysuria 5/9/16    [facility]  [facility] report rec'd     Encounter for gynecological examination 5/19/16    notes from [address] [place] [address] Rd [place] (generalized anxiety disorder)     Hearing loss in left ear     Murmur, cardiac     echocardiogram report from Dr Sinai Arellano  EF 65-70%  1/23/17 note and ekg    Myalgia     Pure hypercholesterolemia 11/14/2009    Screening for glaucoma 07/14/2016    Dr Megan Jessica note rec'd    Unexplained weight loss 6/6/16    GI spec Dr Ricki Benitez note rec'd     Past Surgical History:   Procedure Laterality Date    ENDOSCOPY, COLON, DIAGNOSTIC  2/2005    ad polyp; 3 year repeat ; Rhea Davis ENDOSCOPY, COLON, DIAGNOSTIC  3/2008    neg; 5 year repeat; Ricki Benitez    HX TUBAL LIGATION      [place] COLONOSCOPY FLX DX W/COLLJ [address] WHEN PFRMD  11/21/2013; 2008; 2003          Family History   Problem Relation Age of Onset    Dementia Mother     Other Mother         Vitamin B12 deficiency/Pernicious anemia    Congenital heart defect Father  Cancer Brother         Prostate CA    Stroke Brother     Cancer Daughter         Non-Hodgkin's lymphoma    Breast Cancer Maternal Aunt 54     Social History     Tobacco Use    Smoking status: Former Smoker     Packs/day: 0.50     Years: 20.00     Pack years: 10.00     Types: Cigarettes     Last attempt to quit: 1973     Years since quittin.9    Smokeless tobacco: Never Used   Substance Use Topics    Alcohol use: Yes     Comment: very little (2-3x/month)       ROS  Per HPI    Patient-Reported Vitals 2020   Patient-Reported Weight 150lb   Patient-Reported Height 5'5        Felisa Balderrama, who was evaluated through a patient-initiated, synchronous (real-time) audio only encounter, and/or her healthcare decision maker, is aware that it is a billable service, with coverage as determined by her insurance carrier. She provided verbal consent to proceed: Yes. She has not had a related appointment within my department in the past 7 days or scheduled within the next 24 hours.       Total Time: minutes: 11-20 minutes    Chelo Villalobos PA-C

## 2020-12-19 DIAGNOSIS — F41.9 ANXIETY: ICD-10-CM

## 2020-12-20 RX ORDER — DIAZEPAM 5 MG/1
TABLET ORAL
Qty: 75 TAB | Refills: 0 | Status: SHIPPED | OUTPATIENT
Start: 2020-12-20 | End: 2021-01-25

## 2020-12-21 DIAGNOSIS — E03.9 ACQUIRED HYPOTHYROIDISM: ICD-10-CM

## 2020-12-21 RX ORDER — LEVOTHYROXINE SODIUM 25 UG/1
TABLET ORAL
Qty: 90 TAB | Refills: 1 | Status: SHIPPED | OUTPATIENT
Start: 2020-12-21 | End: 2021-04-13

## 2020-12-29 ENCOUNTER — VIRTUAL VISIT (OUTPATIENT)
Dept: FAMILY MEDICINE CLINIC | Age: 80
End: 2020-12-29
Payer: MEDICARE

## 2020-12-29 DIAGNOSIS — E78.00 PURE HYPERCHOLESTEROLEMIA: ICD-10-CM

## 2020-12-29 DIAGNOSIS — F41.9 ANXIETY: Primary | ICD-10-CM

## 2020-12-29 PROCEDURE — 99442 PR PHYS/QHP TELEPHONE EVALUATION 11-20 MIN: CPT | Performed by: FAMILY MEDICINE

## 2020-12-29 NOTE — PROGRESS NOTES
Burgess Madsen is a 78 y.o. female, evaluated via audio-only technology on 12/29/2020 for Results (Phone call [phone])  . Assessment & Plan:   Diagnoses and all orders for this visit:    1. Anxiety    2. Pure hypercholesterolemia        12  Subjective:     Gave blood in November but no results. Reviewed labs. Reviewed transition to new doctor. Prior to Admission medications    Medication Sig Start Date End Date Taking? Authorizing Provider   levothyroxine (SYNTHROID) 25 mcg tablet TAKE 1 TABLET BY MOUTH DAILY BEFORE BREAKFAST 12/21/20  Yes Ly Andrade MD   diazePAM (VALIUM) 5 mg tablet TAKE 1 TABLET BY MOUTH EVERY MORNING AND AT BEDTIME AND TAKE 1/2 TABLET BY MOUTH MID-DAY FOR ANXIETY 12/20/20  Yes Ly Andrade MD   atorvastatin (LIPITOR) 10 mg tablet TAKE 1 TABLET EVERY DAY. 10/28/20  Yes Chelo Dukes PA-C   Potassium Gluconate 595 mg (99 mg) tablet  4/17/18  Yes Provider, Historical   cyanocobalamin 1,000 mcg tablet Take 1,000 mcg by mouth daily. Yes Provider, Historical   magnesium oxide (MAG-OX) 400 mg tablet Take 400 mg by mouth daily. Yes Provider, Historical   cholecalciferol (VITAMIN D3) 1,000 unit tablet Take  by mouth daily. Yes Provider, Historical   aspirin delayed-release 81 mg tablet Take 81 mg by mouth daily.    Yes 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.91    DJD (degenerative joint disease) of knee M17.10    Elevated blood pressure reading without diagnosis of hypertension R03.0    Abnormal TSH R79.89     Current Outpatient Medications   Medication Sig Dispense Refill    levothyroxine (SYNTHROID) 25 mcg tablet TAKE 1 TABLET BY MOUTH DAILY BEFORE BREAKFAST 90 Tab 1    diazePAM (VALIUM) 5 mg tablet TAKE 1 TABLET BY MOUTH EVERY MORNING AND AT BEDTIME AND TAKE 1/2 TABLET BY MOUTH MID-DAY FOR ANXIETY 75 Tab 0    atorvastatin (LIPITOR) 10 mg tablet TAKE 1 TABLET EVERY DAY. 90 Tab 1    Potassium Gluconate 595 mg (99 mg) tablet       cyanocobalamin 1,000 mcg tablet Take 1,000 mcg by mouth daily.  magnesium oxide (MAG-OX) 400 mg tablet Take 400 mg by mouth daily.  cholecalciferol (VITAMIN D3) 1,000 unit tablet Take  by mouth daily.  aspirin delayed-release 81 mg tablet Take 81 mg by mouth daily.        Allergies   Allergen Reactions    Ciprofloxacin Nausea Only     Past Medical History:   Diagnosis Date    Advance directive discussed with patient 2016    Dysuria 16    [facility]  [facility] report rec'd     Encounter for gynecological examination 16    notes from [address] [place] (generalized anxiety disorder)     Hearing loss in left ear     Murmur, cardiac     echocardiogram report from Dr Irving Kohler  EF 65-70%  17 note and ekg    Myalgia     Pure hypercholesterolemia 2009    Screening for glaucoma 2016    Dr Vidya Fuentes note rec'd    Unexplained weight loss 16    GI spec Dr Lionel Oropeza note rec'd     Past Surgical History:   Procedure Laterality Date    ENDOSCOPY, COLON, DIAGNOSTIC  2005    ad polyp; 3 year repeat ; Selene Leon ENDOSCOPY, COLON, DIAGNOSTIC  3/2008    neg; 5 year repeat; Lionel Oropeza    HX TUBAL LIGATION      [place] COLONOSCOPY FLX DX W/COLLJ [address] WHEN PFRMD  2013; ;           Family History   Problem Relation Age of Onset    Dementia Mother     Other Mother         Vitamin B12 deficiency/Pernicious anemia    Congenital heart defect Father     Cancer Brother         Prostate CA    Stroke Brother     Cancer Daughter         Non-Hodgkin's lymphoma    Breast Cancer Maternal Aunt 54     Social History     Tobacco Use    Smoking status: Former Smoker     Packs/day: 0.50     Years: 20.00     Pack years: 10.00     Types: Cigarettes     Quit date: 1973     Years since quittin.1    Smokeless tobacco: Never Used   Substance Use Topics    Alcohol use: Yes     Comment: very little (2-3x/month)       ROS    Patient-Reported Vitals 7/20/2020   Patient-Reported Weight 150lb   Patient-Reported Height 5'5        Neeru Cortez, who was evaluated through a patient-initiated, synchronous (real-time) audio only encounter, and/or her healthcare decision maker, is aware that it is a billable service, with coverage as determined by her insurance carrier. She provided verbal consent to proceed: Yes. She has not had a related appointment within my department in the past 7 days or scheduled within the next 24 hours.       Total Time: minutes: 11-20 minutes    Tevin Bullock MD

## 2020-12-29 NOTE — PROGRESS NOTES
Identified pt with two pt identifiers(name and ). Reviewed record in preparation for visit and have obtained necessary documentation. Chief Complaint   Patient presents with    Results     Phone call [phone]        Health Maintenance Due   Topic    Shingrix Vaccine Age 50> (1 of 2)    Pneumococcal 65+ years (1 of 1 - PPSV23)    GLAUCOMA SCREENING Q2Y     Flu Vaccine (1)    Medicare Yearly Exam         Visit Vitals  LMP  (LMP Unknown)     Pain Scale: /10    Coordination of Care Questionnaire:  :   1. Have you been to the ER, urgent care clinic since your last visit? Hospitalized since your last visit? No    2. Have you seen or consulted any other health care providers outside of the [address] [place] [address] since your last visit? Include any pap smears or colon screening.  [facility]

## 2021-01-04 ENCOUNTER — OFFICE VISIT (OUTPATIENT)
Dept: FAMILY MEDICINE CLINIC | Age: 81
End: 2021-01-04
Payer: MEDICARE

## 2021-01-04 VITALS
RESPIRATION RATE: 16 BRPM | OXYGEN SATURATION: 96 % | SYSTOLIC BLOOD PRESSURE: 142 MMHG | WEIGHT: 153 LBS | HEIGHT: 63 IN | HEART RATE: 86 BPM | TEMPERATURE: 96.8 F | BODY MASS INDEX: 27.11 KG/M2 | DIASTOLIC BLOOD PRESSURE: 88 MMHG

## 2021-01-04 DIAGNOSIS — R03.0 ELEVATED BLOOD PRESSURE READING: ICD-10-CM

## 2021-01-04 DIAGNOSIS — F41.9 ANXIETY: Primary | ICD-10-CM

## 2021-01-04 PROCEDURE — 99213 OFFICE O/P EST LOW 20 MIN: CPT | Performed by: STUDENT IN AN ORGANIZED HEALTH CARE EDUCATION/TRAINING PROGRAM

## 2021-01-04 PROCEDURE — G8428 CUR MEDS NOT DOCUMENT: HCPCS | Performed by: STUDENT IN AN ORGANIZED HEALTH CARE EDUCATION/TRAINING PROGRAM

## 2021-01-04 PROCEDURE — G8399 PT W/DXA RESULTS DOCUMENT: HCPCS | Performed by: STUDENT IN AN ORGANIZED HEALTH CARE EDUCATION/TRAINING PROGRAM

## 2021-01-04 PROCEDURE — G8419 CALC BMI OUT NRM PARAM NOF/U: HCPCS | Performed by: STUDENT IN AN ORGANIZED HEALTH CARE EDUCATION/TRAINING PROGRAM

## 2021-01-04 PROCEDURE — 1101F PT FALLS ASSESS-DOCD LE1/YR: CPT | Performed by: STUDENT IN AN ORGANIZED HEALTH CARE EDUCATION/TRAINING PROGRAM

## 2021-01-04 PROCEDURE — G8536 NO DOC ELDER MAL SCRN: HCPCS | Performed by: STUDENT IN AN ORGANIZED HEALTH CARE EDUCATION/TRAINING PROGRAM

## 2021-01-04 PROCEDURE — 1090F PRES/ABSN URINE INCON ASSESS: CPT | Performed by: STUDENT IN AN ORGANIZED HEALTH CARE EDUCATION/TRAINING PROGRAM

## 2021-01-04 PROCEDURE — G8432 DEP SCR NOT DOC, RNG: HCPCS | Performed by: STUDENT IN AN ORGANIZED HEALTH CARE EDUCATION/TRAINING PROGRAM

## 2021-01-04 NOTE — PROGRESS NOTES
[facility]  [address] [facility], [address]  [phone]    C/C: Anxiety and Elevated blood pressure   HPI:    Tamar Staton is a 78 y.o. female who presents to clinic today for evaluation of the issues listed above. Pt is new to the practice . Previous pcp: Dr. Cindy Lay who is now retiring       PMHx: Reviewed and updated under problems    Current concerns; Anxiety:  Chronic problem. On Valium which she has been taking for about 3 years. Consistent refilled by her previous pcp. Elevated BP: Pt states that this is an ongoing problem. Generally gets elevated when she is at the doctor''s office. Never on any medication as it improves upon recheck. Pt denies any  fever, chill, night sweats, chest pain, pressure, SOB, HASSAN, PND, orthopnea, abdominal pain, n/v/d, melena, hematuria, dysuria, constipation, HA, dizziness, and syncope. Other Health Habits and social history:  Smoking history: no  Alcohol history: no  Marital status: Patient is currently  and has 2 children. Current Medications: Reviewed and updated in the chart. Allergies- reviewed: Allergies   Allergen Reactions    Ciprofloxacin Nausea Only       Medications- reviewed:   Current Outpatient Medications   Medication Sig    levothyroxine (SYNTHROID) 25 mcg tablet TAKE 1 TABLET BY MOUTH DAILY BEFORE BREAKFAST    diazePAM (VALIUM) 5 mg tablet TAKE 1 TABLET BY MOUTH EVERY MORNING AND AT BEDTIME AND TAKE 1/2 TABLET BY MOUTH MID-DAY FOR ANXIETY    atorvastatin (LIPITOR) 10 mg tablet TAKE 1 TABLET EVERY DAY.  cyanocobalamin 1,000 mcg tablet Take 1,000 mcg by mouth daily.  magnesium oxide (MAG-OX) 400 mg tablet Take 400 mg by mouth daily.  cholecalciferol (VITAMIN D3) 1,000 unit tablet Take  by mouth daily.  aspirin delayed-release 81 mg tablet Take 81 mg by mouth daily.     Potassium Gluconate 595 mg (99 mg) tablet      No current facility-administered medications for this visit.         Past Medical History- reviewed:  Past Medical History:   Diagnosis Date    Advance directive discussed with patient 03/23/2016    Dysuria 5/9/16    [facility] notes  US report rec'd     Encounter for gynecological examination 5/19/16    notes from [address] [facility] (generalized anxiety disorder)     Hearing loss in left ear     Murmur, cardiac     echocardiogram report from Dr Subramanian Deep  EF 65-70%  1/23/17 note and ekg    Myalgia     Pure hypercholesterolemia 11/14/2009    Screening for glaucoma 07/14/2016    Dr Ashly Lentz note rec'd    Unexplained weight loss 6/6/16    GI spec Dr Jessica Chang note rec'd       Past Surgical History- reviewed:   Past Surgical History:   Procedure Laterality Date    ENDOSCOPY, COLON, DIAGNOSTIC  2/2005    ad polyp; 3 year repeat ; Jing Situ ENDOSCOPY, COLON, DIAGNOSTIC  3/2008    neg; 5 year repeat; Jessica Chang    HX TUBAL LIGATION      IN COLONOSCOPY FLX DX W/COLLJ [facility] WHEN PFRMD  11/21/2013; 2008; 2003            Family History - reviewed:  Family History   Problem Relation Age of Onset    Dementia Mother     Other Mother         Vitamin B12 deficiency/Pernicious anemia    Congenital heart defect Father     Cancer Brother         Prostate CA    Stroke Brother     Cancer Daughter         Non-Hodgkin's lymphoma    Breast Cancer Maternal Aunt 54       Social History - reviewed:  Social History     Socioeconomic History    Marital status:      Spouse name: Not on file    Number of children: Not on file    Years of education: Not on file    Highest education level: Not on file   Occupational History    Not on file   Social Needs    Financial resource strain: Not on file    Food insecurity     Worry: Not on file     Inability: Not on file    Transportation needs     Medical: Not on file     Non-medical: Not on file   Tobacco Use    Smoking status: Former Smoker     Packs/day: 0.50     Years: 20.00     Pack years: 10.00     Types: Cigarettes Quit date: 1973     Years since quittin.3    Smokeless tobacco: Never Used   Substance and Sexual Activity    Alcohol use: Yes     Comment: very little (2-3x/month)    Drug use: No    Sexual activity: Yes     Partners: Male   Lifestyle    Physical activity     Days per week: Not on file     Minutes per session: Not on file    Stress: Not on file   Relationships    Social connections     Talks on phone: Not on file     Gets together: Not on file     Attends Anabaptist service: Not on file     Active member of club or organization: Not on file     Attends meetings of clubs or organizations: Not on file     Relationship status: Not on file    Intimate partner violence     Fear of current or ex partner: Not on file     Emotionally abused: Not on file     Physically abused: Not on file     Forced sexual activity: Not on file   Other Topics Concern    Not on file   Social History Narrative    Not on file       Review of systems:     A comprehensive review of systems was negative except for that written in the History of Present Illness. Visit Vitals  BP (!) 142/88 (BP 1 Location: Right arm, BP Patient Position: Sitting)   Pulse 86   Temp 96.8 °F (36 °C) (Temporal)   Resp 16   Ht 5' 2.5\" (1.588 m)   Wt 153 lb (69.4 kg)   LMP  (LMP Unknown)   SpO2 96%   BMI 27.54 kg/m²       General: Alert and oriented, in no acute distress. Well nourished. EYE: PERRL. Sclera and conjuctival clear. Extraocular movements intact. EARS: External normal, canals clear, tympanic membranes normal.   NOSE: Mucosa healthy without drainage or ulceration. OROPHARYNX: No suspicious lesions, normal dentition, pharynx, tongue and tonsils normal.  NECK: Supple; no masses; thyroid normal.  LUNGS: Respirations unlabored; clear to auscultation bilaterally. CARDIOVASCULAR: Regular, rate, and rhythm without murmurs, gallops or rubs.   ABDOMEN: Soft; nontender; nondistended; normoactive bowel sounds; no masses or organomegaly. MUSCULOSKELETAL: FROM in all extremities     EXT: No edema. Neurovascularlly intact. Normal gait. SKIN: No rash. No suspicious lesions or moles. Neuro: Mental Status: Pt is alert and oriented to person, place, and time. Assessment/Plan       ICD-10-CM ICD-9-CM    1. Anxiety  F41.9 300.00    2. Elevated blood pressure reading  R03.0 796.2        1. Anxiety  On Valium. Patient was counseled extensively on the need to abstain from drugs with addictive tendencies, its deleterious effects on the brain, cardiovascular system, lungs as well as its social sequelae  - PDMP reviewed and found that patient consistently refilling at a specific practice. No drug seeking behavior.  - Does not need any refills at this time. Normal refills this at [facility] while all other meds go through express mail    2. Elevated blood pressure reading  At goal upon repeat. Will continue to monitor    Follow up: 6 - 9 months for medicare wellness    I have discussed the diagnosis with the patient and the intended plan as seen in the above orders. The patient has received an after-visit summary and questions were answered concerning future plans. I have discussed medication side effects and warnings with the patient as well. Informed patient to return to the office if new symptoms arise.     Signed By: Ambrosio Pichardo MD     January 4, 2021

## 2021-01-04 NOTE — PROGRESS NOTES
Name and  Verified. Former patient of Dr. Eloy Hays    Chief Complaint   Patient presents with   Julianna Spangler New Patient    Establish Care       1. Have you been to the ER, urgent care clinic since your last visit? Hospitalized since your last visit? No      2. Have you seen or consulted any other health care providers outside of the [address] [place] [address] since your last visit? Include any pap smears or colon screening.    No

## 2021-01-22 DIAGNOSIS — F41.9 ANXIETY: ICD-10-CM

## 2021-01-25 RX ORDER — DIAZEPAM 5 MG/1
TABLET ORAL
Qty: 75 TAB | Refills: 0 | Status: SHIPPED | OUTPATIENT
Start: 2021-01-25 | End: 2021-03-04

## 2021-03-04 DIAGNOSIS — F41.9 ANXIETY: ICD-10-CM

## 2021-03-04 RX ORDER — DIAZEPAM 5 MG/1
TABLET ORAL
Qty: 75 TAB | Refills: 0 | Status: SHIPPED | OUTPATIENT
Start: 2021-03-04 | End: 2021-04-08 | Stop reason: SDUPTHER

## 2021-03-04 NOTE — TELEPHONE ENCOUNTER
PCP: Paige Adorno MD    Last appt: 12/29/2020  Future Appointments   Date Time Provider Citlaly Lopez   10/6/2021 11:40 AM Jenn Sue MD [facility] AMB       Requested Prescriptions     Pending Prescriptions Disp Refills    diazePAM (VALIUM) 5 mg tablet [Pharmacy Med Name: DIAZEPAM 5MG TABLETS] 75 Tab      Sig: TAKE 1 TABLET BY MOUTH EVERY MORNING, 1/2 TABLET AT MID DAY AND 1 TABLET EVERY NIGHT AT BEDTIME     No visits with results within 3 Month(s) from this visit. Latest known visit with results is:   Lab Only on 11/19/2020   Component Date Value Ref Range Status    Color 11/19/2020 YELLOW/STRAW    Final    Color Reference Range: Straw, Yellow or Dark Yellow    Appearance 11/19/2020 CLEAR  CLEAR   Final    Specific gravity 11/19/2020 <1.005  1.003 - 1.030 Final    pH (UA) 11/19/2020 7.0  5.0 - 8.0   Final    Protein 11/19/2020 Negative  Negative mg/dL Final    Glucose 11/19/2020 Negative  Negative mg/dL Final    Ketone 11/19/2020 Negative  Negative mg/dL Final    Bilirubin 11/19/2020 Negative  Negative   Final    Blood 11/19/2020 Negative  Negative   Final    Urobilinogen 11/19/2020 0.2  0.2 - 1.0 EU/dL Final    Nitrites 11/19/2020 Negative  Negative   Final    Leukocyte Esterase 11/19/2020 TRACE* Negative   Final    UA:UC IF INDICATED 11/19/2020 CULTURE NOT INDICATED BY UA RESULT  CULTURE NOT INDICATED BY UA RESULT   Final    WBC 11/19/2020 0-4  0 - 4 /hpf Final    RBC 11/19/2020 0-5  0 - 5 /hpf Final    Epithelial cells 11/19/2020 FEW  FEW /lpf Final    Comment: Epithelial cell category consists of squamous cells and /or transitional  urothelial cells. Renal tubular cells, if present, are separately identified as  such.       Bacteria 11/19/2020 Negative  Negative /hpf Final    Hyaline cast 11/19/2020 0-2  0 - 5 /lpf Final    LIPID PROFILE 11/19/2020        Final    Cholesterol, total 11/19/2020 190  <200 MG/DL Final    Triglyceride 11/19/2020 93  <150 MG/DL Final Comment: Based on NCEP-ATP III:  Triglycerides <150 mg/dL  is considered normal, 150-199  mg/dL  borderline high,  200-499 mg/dL high and  greater than or equal to 500  mg/dL very high.  HDL Cholesterol 11/19/2020 64  MG/DL Final    Comment: Based on NCEP ATP III, HDL Cholesterol <40 mg/dL is considered low and >60  mg/dL is elevated.  LDL, calculated 11/19/2020 107.4* 0 - 100 MG/DL Final    Comment: Based on the NCEP-ATP: LDL-C concentrations are considered  optimal <100 mg/dL,  near optimal/above Normal 100-129 mg/dL Borderline High: 130-159, High: 160-189  mg/dL Very High: Greater than or equal to 190 mg/dL      VLDL, calculated 11/19/2020 18.6  MG/DL Final    CHOL/HDL Ratio 11/19/2020 3.0  0.0 - 5.0   Final    TSH 11/19/2020 1.18  0.36 - 3.74 uIU/mL Final    Comment:   Due to TSH heterogeneity, both structurally and degree of glycosylation,  monoclonal antibodies used in the TSH assay may not accurately quantitate TSH.   Therefore, this result should be correlated with clinical findings as well as  with other assessments of thyroid function, e.g., free T4, free T3.      WBC 11/19/2020 5.5  3.6 - 11.0 K/uL Final    RBC 11/19/2020 4.57  3.80 - 5.20 M/uL Final    HGB 11/19/2020 13.7  11.5 - 16.0 g/dL Final    HCT 11/19/2020 41.9  35.0 - 47.0 % Final    MCV 11/19/2020 91.7  80.0 - 99.0 FL Final    MCH 11/19/2020 30.0  26.0 - 34.0 PG Final    MCHC 11/19/2020 32.7  30.0 - 36.5 g/dL Final    RDW 11/19/2020 12.7  11.5 - 14.5 % Final    PLATELET 06/42/6562 503  150 - 400 K/uL Final    MPV 11/19/2020 10.1  8.9 - 12.9 FL Final    NRBC 11/19/2020 0.0  0  WBC Final    ABSOLUTE NRBC 11/19/2020 0.00  0.00 - 0.01 K/uL Final    NEUTROPHILS 11/19/2020 60  32 - 75 % Final    LYMPHOCYTES 11/19/2020 27  12 - 49 % Final    MONOCYTES 11/19/2020 9  5 - 13 % Final    EOSINOPHILS 11/19/2020 3  0 - 7 % Final    BASOPHILS 11/19/2020 1  0 - 1 % Final    IMMATURE GRANULOCYTES 11/19/2020 0  0.0 - 0.5 % Final    ABS. NEUTROPHILS 11/19/2020 3.3  1.8 - 8.0 K/UL Final    ABS. LYMPHOCYTES 11/19/2020 1.5  0.8 - 3.5 K/UL Final    ABS. MONOCYTES 11/19/2020 0.5  0.0 - 1.0 K/UL Final    ABS. EOSINOPHILS 11/19/2020 0.2  0.0 - 0.4 K/UL Final    ABS. BASOPHILS 11/19/2020 0.1  0.0 - 0.1 K/UL Final    ABS. IMM. GRANS. 11/19/2020 0.0  0.00 - 0.04 K/UL Final    DF 11/19/2020 AUTOMATED    Final    Sodium 11/19/2020 136  136 - 145 mmol/L Final    Potassium 11/19/2020 4.2  3.5 - 5.1 mmol/L Final    Chloride 11/19/2020 99  97 - 108 mmol/L Final    CO2 11/19/2020 30  21 - 32 mmol/L Final    Anion gap 11/19/2020 7  5 - 15 mmol/L Final    Glucose 11/19/2020 102* 65 - 100 mg/dL Final    BUN 11/19/2020 6  6 - 20 MG/DL Final    Creatinine 11/19/2020 0.65  0.55 - 1.02 MG/DL Final    BUN/Creatinine ratio 11/19/2020 9* 12 - 20   Final    GFR est AA 11/19/2020 >60  >60 ml/min/1.73m2 Final    GFR est non-AA 11/19/2020 >60  >60 ml/min/1.73m2 Final    Comment: Estimated GFR is calculated using the IDMS-traceable Modification of Diet in  Renal Disease (MDRD) Study equation, reported for both  Americans  (GFRAA) and non- Americans (GFRNA), and normalized to 1.73m2 body  surface area. The physician must decide which value applies to the patient.  Calcium 11/19/2020 9.6  8.5 - 10.1 MG/DL Final    Bilirubin, total 11/19/2020 0.5  0.2 - 1.0 MG/DL Final    ALT (SGPT) 11/19/2020 20  12 - 78 U/L Final    AST (SGOT) 11/19/2020 19  15 - 37 U/L Final    Alk.  phosphatase 11/19/2020 103  45 - 117 U/L Final    Protein, total 11/19/2020 7.5  6.4 - 8.2 g/dL Final    Albumin 11/19/2020 4.3  3.5 - 5.0 g/dL Final    Globulin 11/19/2020 3.2  2.0 - 4.0 g/dL Final    A-G Ratio 11/19/2020 1.3  1.1 - 2.2   Final

## 2021-04-08 DIAGNOSIS — F41.9 ANXIETY: ICD-10-CM

## 2021-04-08 RX ORDER — DIAZEPAM 5 MG/1
TABLET ORAL
Qty: 75 TAB | Refills: 0 | Status: SHIPPED | OUTPATIENT
Start: 2021-04-08 | End: 2021-05-18 | Stop reason: SDUPTHER

## 2021-04-08 NOTE — TELEPHONE ENCOUNTER
Medication refill request: diazePAM (VALIUM) 5 mg tablet  Sig: TAKE 1 TABLET BY MOUTH EVERY MORNING, 1/2 TABLET AT MID DAY AND 1 TABLET EVERY NIGHT AT BEDTIME    Last Refill: 3/4/2021    Last seen: 1/4/2021    Called and notified patient.

## 2021-04-08 NOTE — TELEPHONE ENCOUNTER
Patient wants to get the medication diazePAM (VALIUM) 5 mg tablet , she will be going out of town tomorrow and needs to get the medication.   If any questions please give her a call @ [phone]

## 2021-05-18 DIAGNOSIS — F41.9 ANXIETY: ICD-10-CM

## 2021-05-18 NOTE — TELEPHONE ENCOUNTER
Medication request:  diazePAM (VALIUM) 5 mg tablet   Sig: TAKE 1 TABLET BY MOUTH EVERY MORNING, 1/2 TABLET AT MID DAY AND 1 TABLET EVERY NIGHT AT BEDTIME    Last refill: 4/8/2021    Last visit: 1//8/2021

## 2021-05-18 NOTE — TELEPHONE ENCOUNTER
----- Message from Charleen Judd sent at 5/18/2021  3:10 PM EDT -----  Regarding: Luz Elena Richey MD/REfill  Medication Refill    Caller (if not patient): PT      Relationship of caller (if not patient):N/A      Best contact number(s): [phone]      Name of medication and dosage if known: diazepan ( valium) 5 mg tablets 90 day supply      Is patient out of this medication (yes/no):yes      Pharmacy name: [facility] listed in chart? (yes/no):Yes  Pharmacy phone number: [phone]      Details to clarify the request: Pt requesting refill ( had issues last time receiving it ) wanted doctor to be aware ( as last time it took 5-6 weeks for her to receive)    Charleen Judd

## 2021-05-20 RX ORDER — DIAZEPAM 5 MG/1
TABLET ORAL
Qty: 75 TABLET | Refills: 0 | Status: SHIPPED | OUTPATIENT
Start: 2021-05-20 | End: 2021-06-14 | Stop reason: SDUPTHER

## 2021-06-14 DIAGNOSIS — F41.9 ANXIETY: ICD-10-CM

## 2021-06-14 RX ORDER — DIAZEPAM 5 MG/1
TABLET ORAL
Qty: 75 TABLET | Refills: 0 | Status: SHIPPED | OUTPATIENT
Start: 2021-06-14 | End: 2021-07-21

## 2021-06-29 ENCOUNTER — OFFICE VISIT (OUTPATIENT)
Dept: FAMILY MEDICINE CLINIC | Age: 81
End: 2021-06-29
Payer: MEDICARE

## 2021-06-29 VITALS
BODY MASS INDEX: 25.8 KG/M2 | WEIGHT: 145.6 LBS | TEMPERATURE: 97.4 F | DIASTOLIC BLOOD PRESSURE: 82 MMHG | SYSTOLIC BLOOD PRESSURE: 142 MMHG | HEART RATE: 80 BPM | OXYGEN SATURATION: 95 % | RESPIRATION RATE: 16 BRPM | HEIGHT: 63 IN

## 2021-06-29 DIAGNOSIS — Z13.39 SCREENING FOR ALCOHOLISM: ICD-10-CM

## 2021-06-29 DIAGNOSIS — Z00.00 MEDICARE ANNUAL WELLNESS VISIT, SUBSEQUENT: Primary | ICD-10-CM

## 2021-06-29 DIAGNOSIS — Z78.9 FULL CODE STATUS: ICD-10-CM

## 2021-06-29 PROCEDURE — G8399 PT W/DXA RESULTS DOCUMENT: HCPCS | Performed by: STUDENT IN AN ORGANIZED HEALTH CARE EDUCATION/TRAINING PROGRAM

## 2021-06-29 PROCEDURE — 1101F PT FALLS ASSESS-DOCD LE1/YR: CPT | Performed by: STUDENT IN AN ORGANIZED HEALTH CARE EDUCATION/TRAINING PROGRAM

## 2021-06-29 PROCEDURE — G8432 DEP SCR NOT DOC, RNG: HCPCS | Performed by: STUDENT IN AN ORGANIZED HEALTH CARE EDUCATION/TRAINING PROGRAM

## 2021-06-29 PROCEDURE — G8427 DOCREV CUR MEDS BY ELIG CLIN: HCPCS | Performed by: STUDENT IN AN ORGANIZED HEALTH CARE EDUCATION/TRAINING PROGRAM

## 2021-06-29 PROCEDURE — G0439 PPPS, SUBSEQ VISIT: HCPCS | Performed by: STUDENT IN AN ORGANIZED HEALTH CARE EDUCATION/TRAINING PROGRAM

## 2021-06-29 PROCEDURE — G8419 CALC BMI OUT NRM PARAM NOF/U: HCPCS | Performed by: STUDENT IN AN ORGANIZED HEALTH CARE EDUCATION/TRAINING PROGRAM

## 2021-06-29 PROCEDURE — G8536 NO DOC ELDER MAL SCRN: HCPCS | Performed by: STUDENT IN AN ORGANIZED HEALTH CARE EDUCATION/TRAINING PROGRAM

## 2021-06-29 NOTE — ACP (ADVANCE CARE PLANNING)
Advance Care Planning     Advance Care Planning (ACP) Physician/NP/PA Conversation      Date of Conversation: 6/29/2021  Conducted with: Patient with [address] Decision Maker:     Click here to complete [place] including selection of the [place] Relationship (ie \"Primary\")  Today we documented Decision Maker(s) consistent with Legal Next of Kin hierarchy.  is next of kin    Care Preferences:    Hospitalization: \"If your health worsens and it becomes clear that your chance of recovery is unlikely, what would be your preference regarding hospitalization? \"  The patient would prefer hospitalization. Ventilation: \"If you were unable to breathe on your own and your chance of recovery was unlikely, what would be your preference about the use of a ventilator (breathing machine) if it was available to you? \"   The patient would desire the use of a ventilator. Resuscitation: \"In the event your heart stopped as a result of an underlying serious health condition, would you want attempts to be made to restart your heart, or would you prefer a natural death? \"   Yes, attempt to resuscitate.     Additional topics discussed: treatment goals    Conversation Outcomes / Follow-Up Plan:   ACP complete - no further action today  Reviewed DNR/DNI and patient elects Full Code (Attempt Resuscitation)     Length of Voluntary ACP Conversation in minutes:  <16 minutes (Non-Billable)    Hernandez White MD

## 2021-06-29 NOTE — PROGRESS NOTES
Name and  Verified. Pharmacy verified    Chief Complaint   Patient presents with    Follow-up     Anxiety       1. Have you been to the ER, urgent care clinic since your last visit? Hospitalized since your last visit? No    2. Have you seen or consulted any other health care providers outside of the [address] [place] [address] since your last visit? Include any pap smears or colon screening.  No      Health Maintenance Due   Topic Date Due    Pneumococcal 65+ years (1 of  - PPSV23) Never done    Medicare Yearly Exam  2020

## 2021-06-29 NOTE — PROGRESS NOTES
[facility]  Leonid Terry. [facility], [facility]  [phone]    Date of visit: 6/29/2021    This is an Subsequent Medicare Annual Wellness Visit (AWV), (Performed more than 12 months after effective date of Medicare Part B enrollment and 12 months after last preventive visit, Once in a lifetime)    I have reviewed the patient's medical history in detail and updated the computerized patient record. Helen Ramsey is a 78 y.o. female   History obtained from: the patient.     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.10    Elevated blood pressure reading without diagnosis of hypertension R03.0    Abnormal TSH R79.89     Past Medical History:   Diagnosis Date    Advance directive discussed with patient 03/23/2016    Dysuria 5/9/16    [facility] notes  US report rec'd     Encounter for gynecological examination 5/19/16    notes from [facility] [address] [facility] (generalized anxiety disorder)     Hearing loss in left ear     Murmur, cardiac     echocardiogram report from Dr Nay Andres  EF 65-70%  1/23/17 note and ekg    Myalgia     Pure hypercholesterolemia 11/14/2009    Screening for glaucoma 07/14/2016    Dr Mini Gomes note rec'd    Unexplained weight loss 6/6/16    GI spec Dr Lesa Moore note rec'd      Past Surgical History:   Procedure Laterality Date    ENDOSCOPY, COLON, DIAGNOSTIC  2/2005    ad polyp; 3 year repeat ; Fletcher Ramirez ENDOSCOPY, COLON, DIAGNOSTIC  3/2008    neg; 5 year repeat; Lesa Moore    HX TUBAL LIGATION      CO COLONOSCOPY FLX DX W/COLLJ [address] WHEN PFRMD  11/21/2013; 2008; 2003          Allergies   Allergen Reactions    Ciprofloxacin Nausea Only     Current Outpatient Medications   Medication Sig Dispense Refill    diazePAM (VALIUM) 5 mg tablet TAKE 1 TABLET BY MOUTH EVERY MORNING, 1/2 TABLET AT MID DAY AND 1 TABLET EVERY NIGHT AT BEDTIME 75 Tablet 0    atorvastatin (LIPITOR) 10 mg tablet TAKE 1 TABLET EVERY DAY 90 Tab 3    levothyroxine (SYNTHROID) 25 mcg tablet TAKE 1 TABLET BY MOUTH DAILY BEFORE BREAKFAST 90 Tab 3    Potassium Gluconate 595 mg (99 mg) tablet       cyanocobalamin 1,000 mcg tablet Take 1,000 mcg by mouth daily.  magnesium oxide (MAG-OX) 400 mg tablet Take 400 mg by mouth daily.  cholecalciferol (VITAMIN D3) 1,000 unit tablet Take  by mouth daily.  aspirin delayed-release 81 mg tablet Take 81 mg by mouth daily. Family History   Problem Relation Age of Onset    Dementia Mother     Other Mother         Vitamin B12 deficiency/Pernicious anemia    Congenital heart defect Father     Cancer Brother         Prostate CA    Stroke Brother     Cancer Daughter         Non-Hodgkin's lymphoma    Breast Cancer Maternal Aunt 54     Social History     Tobacco Use    Smoking status: Former Smoker     Packs/day: 0.50     Years: 20.00     Pack years: 10.00     Types: Cigarettes     Quit date: 1973     Years since quittin.6    Smokeless tobacco: Never Used   Substance Use Topics    Alcohol use: Yes     Comment: very little (2-3x/month)       Specialists/Care Team   Kacie Doherty.  Fidencio Ferrari has established care with the following healthcare providers:  Patient Care Team:  Huy Solis MD as PCP - General (Family Medicine)  Lisa Horton MD as PCP - [facility] Empaneled Provider  Birgit Rodrigues MD as Physician (Obstetrics & Gynecology)  Alexy Foreman MD (Ophthalmology)  Rajni Hale MD as Physician (Gastroenterology)  Brina Thorpe DMD (Dental General Practice)  Brent Sol MD (Obstetrics & Gynecology)  Jamin Leija MD (Cardiology)      Depression risk factor screening    -Do you have a history of depression, anxiety, or emotional problems? no  -Over the past 2 weeks, have you felt down, depressed or hopeless? no  -Over the past 2 weeks, have you felt little interest or pleasure in doing things? no    Alcohol Risk Screen    Do you average more than 1 drink per night or more than 7 drinks a week:  No    On any one occasion in the past three months have you have had more than 3 drinks containing alcohol:  No        Functional Ability and Level of Safety:    Hearing: Hearing is good. Activities of Daily Living: The home contains: safe at home  Patient does total self care      Ambulation: with no difficulty       Fall Risk:  Fall Risk Assessment, last 12 mths 6/29/2021   Able to walk? Yes   Fall in past 12 months? 0   Do you feel unsteady? 0   Are you worried about falling 0      Abuse Screen:  Patient is not abused       Cognitive Screening    Has your family/caregiver stated any concerns about your memory: no     Normal  AAOx4    Health Maintenance Due     Health Maintenance Due   Topic Date Due    Pneumococcal 65+ years (1 of 1 - PPSV23) Never done       Review of Systems (if indicated for problems addressed today)     N/A    Physical Examination     Vitals:    06/29/21 1116 06/29/21 1145   BP: (!) 164/81 (!) 142/82   Pulse: 80    Resp: 16    Temp: 97.4 °F (36.3 °C)    TempSrc: Oral    SpO2: 95%    Weight: 145 lb 9.6 oz (66 kg)    Height: 5' 2.5\" (1.588 m)      Body mass index is 26.21 kg/m². No exam data present  Was the patient's timed Up & Go test unsteady or longer than 30 seconds? no      Discussion of Advance Directive   Discussed with Jimmy Redd. Isabelle Solorzano her ability to prepare and advance directive in the case that an injury or illness causes her to be unable to make health care decisions. Discussed.  is next of kin. See separate notes. Full CODE  Assessment/Plan   Education and counseling provided:  Are appropriate based on today's review and evaluation      ICD-10-CM ICD-9-CM    1. Medicare annual wellness visit, subsequent  Z00.00 V70.0 [place] ANNUAL ALCOHOL SCREEN 15 MIN   2.  Full code status  Z78.9 V49.89 FULL CODE   3. Screening for alcoholism  Z13.39 V79.1 [place] ANNUAL ALCOHOL SCREEN 15 MIN       Medicare Wellness, subsequent:  I have discussed with pt the following topics:   - Reviewed diet, exercise and weight control  -Immunizations appropriate for age were discussed with patient and updated   - Recommended sodium restriction   - Reviewed medications and side effects in detail    Follow up: 1 year. RTC to clinic sooner if needed    We discussed the expected course, resolution and complications of the diagnosis(es) in detail. Medication risks, benefits, costs, interactions, and alternatives were discussed as indicated. I advised to contact the office if his condition worsens, changes or fails to improve as anticipated. Pt expressed understanding with the diagnosis(es) and plan. Patient understands that this encounter was a temporary measure, and the importance of further follow up and examination was emphasized. Patient verbalized understanding.       Signed By: Jasen Franco MD     June 29, 2021

## 2021-06-29 NOTE — PATIENT INSTRUCTIONS
Medicare Wellness Visit, Female     The best way to live healthy is to have a lifestyle where you eat a well-balanced diet, exercise regularly, limit alcohol use, and quit all forms of tobacco/nicotine, if applicable. Regular preventive services are another way to keep healthy. Preventive services (vaccines, screening tests, monitoring & exams) can help personalize your care plan, which helps you manage your own care. Screening tests can find health problems at the earliest stages, when they are easiest to treat. [facility] follows the current, evidence-based guidelines published by the [facility] Andrea Finley ([facility]) when recommending preventive services for our patients. Because we follow these guidelines, sometimes recommendations change over time as research supports it. (For example, mammograms used to be recommended annually. Even though Medicare will still pay for an annual mammogram, the newer guidelines recommend a mammogram every two years for women of average risk). Of course, you and your doctor may decide to screen more often for some diseases, based on your risk and your co-morbidities (chronic disease you are already diagnosed with). Preventive services for you include:  - Medicare offers their members a free annual wellness visit, which is time for you and your primary care provider to discuss and plan for your preventive service needs. Take advantage of this benefit every year!  -All adults over the age of 72 should receive the recommended pneumonia vaccines. Current USPSTF guidelines recommend a series of two vaccines for the best pneumonia protection.   -All adults should have a flu vaccine yearly and a tetanus vaccine every 10 years.   -All adults age 48 and older should receive the shingles vaccines (series of two vaccines).       -All adults age 38-68 who are overweight should have a diabetes screening test once every three years.   -All adults born between [birth year] and 1967 should be screened once for Hepatitis C.  -Other screening tests and preventive services for persons with diabetes include: an eye exam to screen for diabetic retinopathy, a kidney function test, a foot exam, and stricter control over your cholesterol.   -Cardiovascular screening for adults with routine risk involves an electrocardiogram (ECG) at intervals determined by your doctor.   -Colorectal cancer screenings should be done for adults age 54-65 with no increased risk factors for colorectal cancer. There are a number of acceptable methods of screening for this type of cancer. Each test has its own benefits and drawbacks. Discuss with your doctor what is most appropriate for you during your annual wellness visit. The different tests include: colonoscopy (considered the best screening method), a fecal occult blood test, a fecal DNA test, and sigmoidoscopy.    -A bone mass density test is recommended when a woman turns 65 to screen for osteoporosis. This test is only recommended one time, as a screening. Some providers will use this same test as a disease monitoring tool if you already have osteoporosis. -Breast cancer screenings are recommended every other year for women of normal risk, age 54-69.  -Cervical cancer screenings for women over age 72 are only recommended with certain risk factors.      Here is a list of your current Health Maintenance items (your personalized list of preventive services) with a due date:  Health Maintenance Due   Topic Date Due    Pneumococcal Vaccine (1 of 1 - PPSV23) Never done

## 2021-07-21 DIAGNOSIS — F41.9 ANXIETY: ICD-10-CM

## 2021-07-21 RX ORDER — DIAZEPAM 5 MG/1
TABLET ORAL
Qty: 75 TABLET | Refills: 0 | Status: SHIPPED | OUTPATIENT
Start: 2021-07-21 | End: 2021-08-23

## 2021-08-09 DIAGNOSIS — R09.81 NASAL CONGESTION: Primary | ICD-10-CM

## 2021-08-09 RX ORDER — FLUTICASONE PROPIONATE 50 MCG
2 SPRAY, SUSPENSION (ML) NASAL DAILY
Qty: 1 BOTTLE | Refills: 1 | Status: SHIPPED | OUTPATIENT
Start: 2021-08-09 | End: 2021-08-09

## 2021-08-23 DIAGNOSIS — F41.9 ANXIETY: ICD-10-CM

## 2021-08-23 RX ORDER — DIAZEPAM 5 MG/1
TABLET ORAL
Qty: 75 TABLET | Refills: 0 | Status: SHIPPED | OUTPATIENT
Start: 2021-08-23 | End: 2021-09-30

## 2021-09-30 DIAGNOSIS — F41.9 ANXIETY: ICD-10-CM

## 2021-09-30 RX ORDER — DIAZEPAM 5 MG/1
TABLET ORAL
Qty: 75 TABLET | Refills: 0 | Status: SHIPPED | OUTPATIENT
Start: 2021-09-30 | End: 2021-11-01

## 2021-11-01 DIAGNOSIS — F41.9 ANXIETY: ICD-10-CM

## 2021-11-01 RX ORDER — DIAZEPAM 5 MG/1
TABLET ORAL
Qty: 75 TABLET | Refills: 5 | Status: SHIPPED | OUTPATIENT
Start: 2021-11-01 | End: 2022-07-13

## 2021-11-02 ENCOUNTER — HOSPITAL ENCOUNTER (EMERGENCY)
Age: 81
Discharge: HOME | End: 2021-11-02
Attending: EMERGENCY MEDICINE
Payer: MEDICARE

## 2021-11-02 VITALS
DIASTOLIC BLOOD PRESSURE: 99 MMHG | WEIGHT: 139.77 LBS | SYSTOLIC BLOOD PRESSURE: 158 MMHG | OXYGEN SATURATION: 97 % | TEMPERATURE: 98.2 F | RESPIRATION RATE: 16 BRPM | HEART RATE: 78 BPM | BODY MASS INDEX: 23.29 KG/M2 | HEIGHT: 65 IN

## 2021-11-02 DIAGNOSIS — N39.0 URINARY TRACT INFECTION WITHOUT HEMATURIA, SITE UNSPECIFIED: Primary | ICD-10-CM

## 2021-11-02 LAB
APPEARANCE UR: ABNORMAL
BACTERIA URNS QL MICRO: ABNORMAL /HPF
BILIRUB UR QL: NEGATIVE
COLOR UR: ABNORMAL
EPITH CASTS URNS QL MICRO: ABNORMAL /LPF
GLUCOSE UR STRIP.AUTO-MCNC: NEGATIVE MG/DL
HGB UR QL STRIP: ABNORMAL
KETONES UR QL STRIP.AUTO: NEGATIVE MG/DL
LEUKOCYTE ESTERASE UR QL STRIP.AUTO: ABNORMAL
NITRITE UR QL STRIP.AUTO: NEGATIVE
OTHER,OTHU: ABNORMAL
PH UR STRIP: 6.5 [PH] (ref 5–8)
PROT UR STRIP-MCNC: NEGATIVE MG/DL
RBC #/AREA URNS HPF: ABNORMAL /HPF (ref 0–5)
SP GR UR REFRACTOMETRY: 1 (ref 1–1.03)
UA: UC IF INDICATED,UAUC: ABNORMAL
UROBILINOGEN UR QL STRIP.AUTO: 0.2 EU/DL (ref 0.2–1)
WBC URNS QL MICRO: >100 /HPF (ref 0–4)

## 2021-11-02 PROCEDURE — 87086 URINE CULTURE/COLONY COUNT: CPT

## 2021-11-02 PROCEDURE — 74011250637 HC RX REV CODE- 250/637: Performed by: EMERGENCY MEDICINE

## 2021-11-02 PROCEDURE — 87077 CULTURE AEROBIC IDENTIFY: CPT

## 2021-11-02 PROCEDURE — 99283 EMERGENCY DEPT VISIT LOW MDM: CPT

## 2021-11-02 PROCEDURE — 81001 URINALYSIS AUTO W/SCOPE: CPT

## 2021-11-02 PROCEDURE — 87186 SC STD MICRODIL/AGAR DIL: CPT

## 2021-11-02 RX ORDER — CEPHALEXIN 500 MG/1
500 CAPSULE ORAL 3 TIMES DAILY
Qty: 15 CAPSULE | Refills: 0 | Status: SHIPPED | OUTPATIENT
Start: 2021-11-02 | End: 2021-11-07

## 2021-11-02 RX ORDER — CEPHALEXIN 250 MG/1
500 CAPSULE ORAL
Status: COMPLETED | OUTPATIENT
Start: 2021-11-02 | End: 2021-11-02

## 2021-11-02 RX ADMIN — CEPHALEXIN 500 MG: 250 CAPSULE ORAL at 14:38

## 2021-11-02 NOTE — ED TRIAGE NOTES
Patient reports urinary urgency. Patient denies any fevers or chills. Patient does acknowledge a history of UTIs.

## 2021-11-02 NOTE — ED NOTES
Assumed care of patient from triage, patient w c/o urinary frequency and burning onset was 4 days ago. Patient reports she has a h/o UTI. Patient in a position of comfort and MD to bedside evaluating at this time.

## 2021-11-03 NOTE — ED PROVIDER NOTES
EMERGENCY DEPARTMENT HISTORY AND PHYSICAL EXAM      Date: 11/2/2021  Patient Name: Faye Carrizales    History of Presenting Illness     Chief Complaint   Patient presents with    Urinary Frequency       History Provided By: Patient    HPI: Faye Carrizales, 78 y.o. female presents to the ED with cc of urinary symptoms. Pt states she began to note symptoms 4 days ago c/w her UTIs. She had not been able to get in to see PCP and came to the ED for further eval. Pt s/o mild pelvic discomfort worse with urination. She has had urinary frequency. She denies fever/chills. She denies any abd pain n/v/d. There are no other complaints, changes, or physical findings at this time. PCP: Dante Rollins MD    No current facility-administered medications on file prior to encounter. Current Outpatient Medications on File Prior to Encounter   Medication Sig Dispense Refill    fluticasone propionate (FLONASE) 50 mcg/actuation nasal spray SHAKE LIQUID AND USE 2 SPRAYS IN EACH NOSTRIL DAILY 48 g 3    atorvastatin (LIPITOR) 10 mg tablet TAKE 1 TABLET EVERY DAY 90 Tab 3    levothyroxine (SYNTHROID) 25 mcg tablet TAKE 1 TABLET BY MOUTH DAILY BEFORE BREAKFAST 90 Tab 3    Potassium Gluconate 595 mg (99 mg) tablet       cyanocobalamin 1,000 mcg tablet Take 1,000 mcg by mouth daily.  magnesium oxide (MAG-OX) 400 mg tablet Take 400 mg by mouth daily.  cholecalciferol (VITAMIN D3) 1,000 unit tablet Take  by mouth daily.  aspirin delayed-release 81 mg tablet Take 81 mg by mouth daily.       diazePAM (VALIUM) 5 mg tablet TAKE 1 TABLET BY MOUTH EVERY MORNING, 1/2 TABLET MID-DAY AND 1 TABLET EVERY NIGHT AT BEDTIME 75 Tablet 5       Past History     Past Medical History:  Past Medical History:   Diagnosis Date    Advance directive discussed with patient 03/23/2016    Dysuria 5/9/16    [facility]  [facility] report rec'd     Encounter for gynecological examination 5/19/16    notes from [address] [place] [address] Feliciano CHONG (generalized anxiety disorder)     Hearing loss in left ear     Murmur, cardiac     echocardiogram report from Dr Sarah Cervantes  EF 65-70%  17 note and ekg    Myalgia     Pure hypercholesterolemia 2009    Screening for glaucoma 2016    Dr Lisa Wood note rec'd    Unexplained weight loss 16    GI spec Dr Nadir Pascual note rec'd       Past Surgical History:  Past Surgical History:   Procedure Laterality Date    ENDOSCOPY, COLON, DIAGNOSTIC  2005    ad polyp; 3 year repeat ; Neomia Blow ENDOSCOPY, COLON, DIAGNOSTIC  3/2008    neg; 5 year repeat; Nadir Pascual    HX TUBAL LIGATION      [place] COLON[place] FLX DX W/COLLJ [address] WHEN PFRMD  2013; 2003            Family History:  Family History   Problem Relation Age of Onset    Dementia Mother     Other Mother         Vitamin B12 deficiency/Pernicious anemia    Congenital heart defect Father     Cancer Brother         Prostate CA    Stroke Brother     Cancer Daughter         Non-Hodgkin's lymphoma    Breast Cancer Maternal Aunt 54       Social History:  Social History     Tobacco Use    Smoking status: Former Smoker     Packs/day: 0.50     Years: 20.00     Pack years: 10.00     Types: Cigarettes     Quit date: 1973     Years since quittin.9    Smokeless tobacco: Never Used   Substance Use Topics    Alcohol use: Yes     Comment: very little (2-3x/month)    Drug use: No       Allergies: Allergies   Allergen Reactions    Ciprofloxacin Nausea Only         Review of Systems   Review of Systems   Constitutional: Negative. Negative for appetite change, chills, fatigue and fever. HENT: Negative. Negative for congestion, rhinorrhea, sinus pressure and sore throat. Eyes: Negative. Respiratory: Negative. Negative for cough, choking, chest tightness, shortness of breath and wheezing. Cardiovascular: Negative. Negative for chest pain, palpitations and leg swelling.    Gastrointestinal: Negative for abdominal pain, constipation, diarrhea, nausea and vomiting. Endocrine: Negative. Genitourinary: Positive for frequency and pelvic pain. Negative for difficulty urinating, dysuria, flank pain and urgency. Musculoskeletal: Negative. Skin: Negative. Neurological: Negative. Negative for dizziness, speech difficulty, weakness, light-headedness, numbness and headaches. Psychiatric/Behavioral: Negative. All other systems reviewed and are negative. Physical Exam   Physical Exam  Vitals and nursing note reviewed. Constitutional:       General: She is not in acute distress. Appearance: She is well-developed. She is not ill-appearing or diaphoretic. HENT:      Head: Normocephalic and atraumatic. Mouth/Throat:      Mouth: Mucous membranes are moist.      Pharynx: No oropharyngeal exudate. Eyes:      Conjunctiva/sclera: Conjunctivae normal.      Pupils: Pupils are equal, round, and reactive to light. Neck:      Vascular: No JVD. Trachea: No tracheal deviation. Cardiovascular:      Rate and Rhythm: Normal rate and regular rhythm. Heart sounds: Normal heart sounds. No murmur heard. Pulmonary:      Effort: Pulmonary effort is normal. No respiratory distress. Breath sounds: Normal breath sounds. No stridor. No wheezing or rales. Abdominal:      General: There is no distension. Palpations: Abdomen is soft. Tenderness: There is no abdominal tenderness. There is no guarding or rebound. Musculoskeletal:         General: No tenderness. Normal range of motion. Cervical back: Normal range of motion and neck supple. Right lower leg: No edema. Left lower leg: No edema. Skin:     General: Skin is warm and dry. Capillary Refill: Capillary refill takes less than 2 seconds. Neurological:      Mental Status: She is alert and oriented to person, place, and time. Cranial Nerves: No cranial nerve deficit.       Comments: No gross motor or sensory deficits    Psychiatric: Behavior: Behavior normal.         Diagnostic Study Results     Labs -     Recent Results (from the past 12 hour(s))   URINALYSIS W/ REFLEX CULTURE    Collection Time: 11/02/21  1:17 PM    Specimen: Urine   Result Value Ref Range    Color YELLOW/STRAW      Appearance CLOUDY (A) CLEAR      Specific gravity 1.005 1.003 - 1.030      pH (UA) 6.5 5.0 - 8.0      Protein Negative NEG mg/dL    Glucose Negative NEG mg/dL    Ketone Negative NEG mg/dL    Bilirubin Negative NEG      Blood TRACE (A) NEG      Urobilinogen 0.2 0.2 - 1.0 EU/dL    Nitrites Negative NEG      Leukocyte Esterase LARGE (A) NEG      WBC >100 (H) 0 - 4 /hpf    RBC 5-10 0 - 5 /hpf    Epithelial cells FEW FEW /lpf    Bacteria 3+ (A) NEG /hpf    UA:UC IF INDICATED URINE CULTURE ORDERED (A) CNI      Other: Renal Epithelial cells Present         Radiologic Studies -   No orders to display       Medical Decision Making   I am the first provider for this patient. I reviewed the vital signs, available nursing notes, past medical history, past surgical history, family history and social history. Vital Signs-Reviewed the patient's vital signs. Patient Vitals for the past 12 hrs:   Temp Pulse Resp BP SpO2   11/02/21 1307 98.2 °F (36.8 °C) 78 16 (!) 158/99 97 %         Records Reviewed: Nursing Notes, Old Medical Records, Previous Radiology Studies and Previous Laboratory Studies, Pt last seen in PCP office 6/29 for routine exam. Pt with hx of Anxiety, HPLD, Thyroid issues     Provider Notes (Medical Decision Making):   DDx- UTI    ED Course:   Initial assessment performed. The patients presenting problems have been discussed, and they are in agreement with the care plan formulated and outlined with them. I have encouraged them to ask questions as they arise throughout their visit. Pt not ill appearing. Pt does have UTI. She has been afebrile, no n/v. Will dose with Ab here, and send Rx to pharmacy for Ab course.      Disposition:  DC home DISCHARGE PLAN:  1. Discharge Medication List as of 11/2/2021  2:37 PM      START taking these medications    Details   cephALEXin (Keflex) 500 mg capsule Take 1 Capsule by mouth three (3) times daily for 5 days. , Normal, Disp-15 Capsule, R-0         CONTINUE these medications which have NOT CHANGED    Details   fluticasone propionate (FLONASE) 50 mcg/actuation nasal spray SHAKE LIQUID AND USE 2 SPRAYS IN EACH NOSTRIL DAILY, Normal, Disp-48 g, R-3**Patient requests 90 days supply**      atorvastatin (LIPITOR) 10 mg tablet TAKE 1 TABLET EVERY DAY, Normal, Disp-90 Tab, R-3      levothyroxine (SYNTHROID) 25 mcg tablet TAKE 1 TABLET BY MOUTH DAILY BEFORE BREAKFAST, Normal, Disp-90 Tab, R-3      Potassium Gluconate 595 mg (99 mg) tablet Historical Med      cyanocobalamin 1,000 mcg tablet Take 1,000 mcg by mouth daily. , Historical Med      magnesium oxide (MAG-OX) 400 mg tablet Take 400 mg by mouth daily. , Historical Med      cholecalciferol (VITAMIN D3) 1,000 unit tablet Take  by mouth daily. , Historical Med      aspirin delayed-release 81 mg tablet Take 81 mg by mouth daily. , Historical Med      diazePAM (VALIUM) 5 mg tablet TAKE 1 TABLET BY MOUTH EVERY MORNING, 1/2 TABLET MID-DAY AND 1 TABLET EVERY NIGHT AT BEDTIME, Normal, Disp-75 Tablet, R-5           2. Follow-up Information     Follow up With Specialties Details Why Contact Info    Urvashi Sue MD Family Medicine  As needed [address]  [phone]          3. Return to ED if worse     Diagnosis     Clinical Impression:   1. Urinary tract infection without hematuria, site unspecified        Attestations:    Ubaldo Medina, DO    Please note that this dictation was completed with [employer], the computer voice recognition software. Quite often unanticipated grammatical, syntax, homophones, and other interpretive errors are inadvertently transcribed by the computer software. Please disregard these errors.   Please excuse any errors that have escaped final proofreading. Thank you.

## 2021-11-04 LAB
BACTERIA SPEC CULT: ABNORMAL
CC UR VC: ABNORMAL
SERVICE CMNT-IMP: ABNORMAL

## 2022-01-01 ENCOUNTER — HOSPITAL ENCOUNTER (EMERGENCY)
Age: 82
Discharge: HOME | End: 2022-01-01
Attending: EMERGENCY MEDICINE
Payer: MEDICARE

## 2022-01-01 VITALS
OXYGEN SATURATION: 97 % | BODY MASS INDEX: 21.83 KG/M2 | SYSTOLIC BLOOD PRESSURE: 184 MMHG | RESPIRATION RATE: 18 BRPM | WEIGHT: 135.8 LBS | DIASTOLIC BLOOD PRESSURE: 100 MMHG | HEIGHT: 66 IN | HEART RATE: 93 BPM | TEMPERATURE: 98.4 F

## 2022-01-01 DIAGNOSIS — N30.00 ACUTE CYSTITIS WITHOUT HEMATURIA: Primary | ICD-10-CM

## 2022-01-01 DIAGNOSIS — R03.0 ELEVATED BLOOD PRESSURE READING WITHOUT DIAGNOSIS OF HYPERTENSION: ICD-10-CM

## 2022-01-01 LAB
APPEARANCE UR: ABNORMAL
BACTERIA URNS QL MICRO: ABNORMAL /HPF
BILIRUB UR QL CFM: ABNORMAL
COLOR UR: ABNORMAL
EPITH CASTS URNS QL MICRO: ABNORMAL /LPF
GLUCOSE UR STRIP.AUTO-MCNC: NEGATIVE MG/DL
HGB UR QL STRIP: NEGATIVE
HYALINE CASTS URNS QL MICRO: ABNORMAL /LPF (ref 0–5)
KETONES UR QL STRIP.AUTO: ABNORMAL MG/DL
LEUKOCYTE ESTERASE UR QL STRIP.AUTO: ABNORMAL
NITRITE UR QL STRIP.AUTO: POSITIVE
PH UR STRIP: 6.5 [PH] (ref 5–8)
PROT UR STRIP-MCNC: NEGATIVE MG/DL
RBC #/AREA URNS HPF: ABNORMAL /HPF (ref 0–5)
SP GR UR REFRACTOMETRY: 1.01 (ref 1–1.03)
UA: UC IF INDICATED,UAUC: ABNORMAL
UROBILINOGEN UR QL STRIP.AUTO: 1 EU/DL (ref 0.2–1)
WBC URNS QL MICRO: >100 /HPF (ref 0–4)

## 2022-01-01 PROCEDURE — 87086 URINE CULTURE/COLONY COUNT: CPT

## 2022-01-01 PROCEDURE — 87077 CULTURE AEROBIC IDENTIFY: CPT

## 2022-01-01 PROCEDURE — 99283 EMERGENCY DEPT VISIT LOW MDM: CPT

## 2022-01-01 PROCEDURE — 81001 URINALYSIS AUTO W/SCOPE: CPT

## 2022-01-01 PROCEDURE — 87186 SC STD MICRODIL/AGAR DIL: CPT

## 2022-01-01 PROCEDURE — 74011250637 HC RX REV CODE- 250/637: Performed by: PHYSICIAN ASSISTANT

## 2022-01-01 RX ORDER — NITROFURANTOIN 25; 75 MG/1; MG/1
100 CAPSULE ORAL
Status: COMPLETED | OUTPATIENT
Start: 2022-01-01 | End: 2022-01-01

## 2022-01-01 RX ORDER — NITROFURANTOIN 25; 75 MG/1; MG/1
100 CAPSULE ORAL 2 TIMES DAILY
Qty: 10 CAPSULE | Refills: 0 | Status: SHIPPED | OUTPATIENT
Start: 2022-01-01 | End: 2022-01-06

## 2022-01-01 RX ADMIN — NITROFURANTOIN (MONOHYDRATE/MACROCRYSTALS) 100 MG: 75; 25 CAPSULE ORAL at 12:33

## 2022-01-01 NOTE — ED NOTES
Maddy HOOVER reviewed discharge instructions with the patient. The patient verbalized understanding. All questions and concerns were addressed. The patient declined a wheelchair and is discharged ambulatory in the care of family members with instructions and prescriptions in hand. Pt is alert and oriented x 4. Respirations are clear and unlabored.

## 2022-01-01 NOTE — ED PROVIDER NOTES
EMERGENCY DEPARTMENT HISTORY AND PHYSICAL EXAM      Date: 1/1/2022  Patient Name: Wil Ambrosio    History of Presenting Illness     Chief Complaint   Patient presents with    Urinary Frequency     with pain, since Thursday, feels like UTI per patient       History Provided By: Patient    HPI: Wil Ambrosio, 78 y.o. female with PMHx as below presents to the ED with cc of urinary frequency and urgency for 5 days. When patient goes to the restroom, only a little urine comes out. She denies dysuria, hematuria, abdominal pain, flank pain, nausea/vomiting, fevers. Symptoms are consistent with prior UTI the patient had 2 months ago. She completed a course of Keflex with complete resolution of her symptoms at that time. She is tolerating p.o. at home without difficulty. There are no other complaints, changes, or physical findings at this time. PCP: Flaquito Menchaca MD    No current facility-administered medications on file prior to encounter. Current Outpatient Medications on File Prior to Encounter   Medication Sig Dispense Refill    diazePAM (VALIUM) 5 mg tablet TAKE 1 TABLET BY MOUTH EVERY MORNING, 1/2 TABLET MID-DAY AND 1 TABLET EVERY NIGHT AT BEDTIME 75 Tablet 5    fluticasone propionate (FLONASE) 50 mcg/actuation nasal spray SHAKE LIQUID AND USE 2 SPRAYS IN EACH NOSTRIL DAILY 48 g 3    atorvastatin (LIPITOR) 10 mg tablet TAKE 1 TABLET EVERY DAY 90 Tab 3    levothyroxine (SYNTHROID) 25 mcg tablet TAKE 1 TABLET BY MOUTH DAILY BEFORE BREAKFAST 90 Tab 3    Potassium Gluconate 595 mg (99 mg) tablet       cyanocobalamin 1,000 mcg tablet Take 1,000 mcg by mouth daily.  magnesium oxide (MAG-OX) 400 mg tablet Take 400 mg by mouth daily.  cholecalciferol (VITAMIN D3) 1,000 unit tablet Take  by mouth daily.  aspirin delayed-release 81 mg tablet Take 81 mg by mouth daily.          Past History     Past Medical History:  Past Medical History:   Diagnosis Date    Advance directive discussed with patient 2016    D[facility] 16    [facility]  [facility] report rec'd     Encounter for gynecological examination 16    notes from [address] [place] (generalized anxiety disorder)     Hearing loss in left ear     Murmur, cardiac     echocardiogram report from Dr Swetha Tomlin  EF 65-70%  17 note and ekg    Myalgia     Pure hypercholesterolemia 2009    Screening for glaucoma 2016    Dr Fco Boogie note rec'd    Unexplained weight loss 16    GI spec Dr Bolivar Echeverria note rec'd       Past Surgical History:  Past Surgical History:   Procedure Laterality Date    ENDOSCOPY, COLON, DIAGNOSTIC  2005    ad polyp; 3 year repeat ; Gregory Leon ENDOSCOPY, COLON, DIAGNOSTIC  3/2008    neg; 5 year repeat; Bolivar Echeverria    HX TUBAL LIGATION      [place] COLONOSCOPY FLX DX W/COLLJ [address] [facility] [address] WHEN PFRMD  2013; ;             Family History:  Family History   Problem Relation Age of Onset    Dementia Mother     Other Mother         Vitamin B12 deficiency/Pernicious anemia    Congenital heart defect Father     Cancer Brother         Prostate CA    Stroke Brother     Cancer Daughter         Non-Hodgkin's lymphoma    Breast Cancer Maternal Aunt 54       Social History:  Social History     Tobacco Use    Smoking status: Former Smoker     Packs/day: 0.50     Years: 20.00     Pack years: 10.00     Types: Cigarettes     Quit date: 1973     Years since quittin.1    Smokeless tobacco: Never Used   Substance Use Topics    Alcohol use: Yes     Comment: very little (2-3x/month)    Drug use: No       Allergies: Allergies   Allergen Reactions    Ciprofloxacin Nausea Only         Review of Systems   Review of Systems   Constitutional: Negative for chills and fever. Gastrointestinal: Negative for abdominal pain, nausea and vomiting. Genitourinary: Positive for frequency and urgency. Negative for dysuria, flank pain and hematuria. Musculoskeletal: Negative for back pain. Physical Exam   Physical Exam  Vitals and nursing note reviewed. Constitutional:       General: She is not in acute distress. Appearance: Normal appearance. She is well-developed. She is not toxic-appearing. HENT:      Head: Normocephalic and atraumatic. Nose: Nose normal.      Mouth/Throat:      Mouth: Mucous membranes are moist.   Eyes:      General: Lids are normal.      Extraocular Movements: Extraocular movements intact. Conjunctiva/sclera: Conjunctivae normal.   Cardiovascular:      Rate and Rhythm: Normal rate and regular rhythm. Pulmonary:      Effort: Pulmonary effort is normal.      Breath sounds: Normal breath sounds. Abdominal:      Palpations: Abdomen is soft. Tenderness: There is no abdominal tenderness. There is no guarding. Musculoskeletal:         General: Normal range of motion. Cervical back: Normal range of motion and neck supple. Skin:     General: Skin is warm and dry. Neurological:      General: No focal deficit present. Mental Status: She is alert and oriented to person, place, and time. Psychiatric:         Mood and Affect: Mood normal.         Behavior: Behavior normal. Behavior is cooperative.          Diagnostic Study Results     Labs -     Recent Results (from the past 12 hour(s))   URINALYSIS W/ REFLEX CULTURE    Collection Time: 01/01/22 11:54 AM    Specimen: Miscellaneous sample; Urine    Urine specimen   Result Value Ref Range    Color ORANGE      Appearance CLOUDY (A) CLEAR      Specific gravity 1.007 1.003 - 1.030      pH (UA) 6.5 5.0 - 8.0      Protein Negative NEG mg/dL    Glucose Negative NEG mg/dL    Ketone TRACE (A) NEG mg/dL    Blood Negative NEG      Urobilinogen 1.0 0.2 - 1.0 EU/dL    Nitrites Positive (A) NEG      Leukocyte Esterase LARGE (A) NEG      UA:UC IF INDICATED URINE CULTURE ORDERED (A) CNI      WBC >100 (H) 0 - 4 /hpf    RBC 0-5 0 - 5 /hpf    Epithelial cells FEW FEW /lpf    Bacteria 1+ (A) NEG /hpf    Hyaline cast 0-2 0 - 5 /lpf   BILIRUBIN, CONFIRM    Collection Time: 01/01/22 11:54 AM   Result Value Ref Range    Bilirubin UA, confirm INDETERMINATE DUE TO COLOR INTERFERENCE (A) NEG         Radiologic Studies -   No orders to display     CT Results  (Last 48 hours)    None        CXR Results  (Last 48 hours)    None            Medical Decision Making   I am the first provider for this patient. I reviewed the vital signs, available nursing notes, past medical history, past surgical history, family history and social history. Vital Signs-Reviewed the patient's vital signs. Patient Vitals for the past 12 hrs:   Temp Pulse Resp BP SpO2   01/01/22 1147 98.4 °F (36.9 °C) 93 18 (!) 184/100 97 %       Records Reviewed: Nursing Notes, Old Medical Records and Previous Laboratory Studies    Provider Notes (Medical Decision Making):   Patient is well-appearing and in no acute distress. VSS. UA consistent with UTI. No evidence of pyelo at this time. Low suspicion for kidney stones. Reviewed urine culture from 2 months ago. It was sensitive to Macrobid, which we will initiate at this time. First dose given while in the ED. Advised on continued use at home. Encouraged medication completion and oral hydration at home. Follow-up with PCP. ED return precautions given. ED Course:   Initial assessment performed. The patients presenting problems have been discussed, and they are in agreement with the care plan formulated and outlined with them. I have encouraged them to ask questions as they arise throughout their visit. Critical Care Time: None    Disposition:  D/c    PLAN:  1. Current Discharge Medication List      START taking these medications    Details   nitrofurantoin, macrocrystal-monohydrate, (Macrobid) 100 mg capsule Take 1 Capsule by mouth two (2) times a day for 5 days. Qty: 10 Capsule, Refills: 0  Start date: 1/1/2022, End date: 1/6/2022           2.    Follow-up Information     Follow up With Specialties Details Why Contact [facility] EMERGENCY DEPT Emergency Medicine  As needed, If symptoms worsen [address] [place] [address]  [phone]    Tiarra Scherer MD Family Medicine Call  For follow up [address] [facility] [place] [address] [phone]          Return to ED if worse     Diagnosis     Clinical Impression:   1. Acute cystitis without hematuria    2. Elevated blood pressure reading without diagnosis of hypertension          Please note that this dictation was completed with [employer], the computer voice recognition software. Quite often unanticipated grammatical, syntax, homophones, and other interpretive errors are inadvertently transcribed by the computer software. Please disregards these errors. Please excuse any errors that have escaped final proofreading.

## 2022-01-03 LAB
BACTERIA SPEC CULT: ABNORMAL
CC UR VC: ABNORMAL
SERVICE CMNT-IMP: ABNORMAL

## 2022-03-18 PROBLEM — R79.89 ABNORMAL TSH: Status: ACTIVE | Noted: 2019-09-30

## 2022-07-12 DIAGNOSIS — F41.9 ANXIETY: ICD-10-CM

## 2022-07-13 RX ORDER — DIAZEPAM 5 MG/1
TABLET ORAL
Qty: 75 TABLET | Refills: 0 | Status: SHIPPED | OUTPATIENT
Start: 2022-07-13 | End: 2022-08-23

## 2022-08-19 ENCOUNTER — OFFICE VISIT (OUTPATIENT)
Dept: FAMILY MEDICINE CLINIC | Age: 82
End: 2022-08-19
Payer: MEDICARE

## 2022-08-19 VITALS
OXYGEN SATURATION: 94 % | BODY MASS INDEX: 18.16 KG/M2 | TEMPERATURE: 96.9 F | HEART RATE: 95 BPM | SYSTOLIC BLOOD PRESSURE: 152 MMHG | WEIGHT: 113 LBS | DIASTOLIC BLOOD PRESSURE: 90 MMHG | RESPIRATION RATE: 16 BRPM | HEIGHT: 66 IN

## 2022-08-19 DIAGNOSIS — R73.09 ABNORMAL GLUCOSE: ICD-10-CM

## 2022-08-19 DIAGNOSIS — Z00.00 MEDICARE ANNUAL WELLNESS VISIT, SUBSEQUENT: Primary | ICD-10-CM

## 2022-08-19 DIAGNOSIS — E03.9 ACQUIRED HYPOTHYROIDISM: ICD-10-CM

## 2022-08-19 DIAGNOSIS — E78.2 MIXED HYPERLIPIDEMIA: ICD-10-CM

## 2022-08-19 PROCEDURE — 1090F PRES/ABSN URINE INCON ASSESS: CPT | Performed by: STUDENT IN AN ORGANIZED HEALTH CARE EDUCATION/TRAINING PROGRAM

## 2022-08-19 PROCEDURE — 1123F ACP DISCUSS/DSCN MKR DOCD: CPT | Performed by: STUDENT IN AN ORGANIZED HEALTH CARE EDUCATION/TRAINING PROGRAM

## 2022-08-19 PROCEDURE — G8536 NO DOC ELDER MAL SCRN: HCPCS | Performed by: STUDENT IN AN ORGANIZED HEALTH CARE EDUCATION/TRAINING PROGRAM

## 2022-08-19 PROCEDURE — G8399 PT W/DXA RESULTS DOCUMENT: HCPCS | Performed by: STUDENT IN AN ORGANIZED HEALTH CARE EDUCATION/TRAINING PROGRAM

## 2022-08-19 PROCEDURE — G8419 CALC BMI OUT NRM PARAM NOF/U: HCPCS | Performed by: STUDENT IN AN ORGANIZED HEALTH CARE EDUCATION/TRAINING PROGRAM

## 2022-08-19 PROCEDURE — G0439 PPPS, SUBSEQ VISIT: HCPCS | Performed by: STUDENT IN AN ORGANIZED HEALTH CARE EDUCATION/TRAINING PROGRAM

## 2022-08-19 PROCEDURE — 99213 OFFICE O/P EST LOW 20 MIN: CPT | Performed by: STUDENT IN AN ORGANIZED HEALTH CARE EDUCATION/TRAINING PROGRAM

## 2022-08-19 PROCEDURE — 1101F PT FALLS ASSESS-DOCD LE1/YR: CPT | Performed by: STUDENT IN AN ORGANIZED HEALTH CARE EDUCATION/TRAINING PROGRAM

## 2022-08-19 PROCEDURE — G8510 SCR DEP NEG, NO PLAN REQD: HCPCS | Performed by: STUDENT IN AN ORGANIZED HEALTH CARE EDUCATION/TRAINING PROGRAM

## 2022-08-19 PROCEDURE — G8427 DOCREV CUR MEDS BY ELIG CLIN: HCPCS | Performed by: STUDENT IN AN ORGANIZED HEALTH CARE EDUCATION/TRAINING PROGRAM

## 2022-08-19 NOTE — PROGRESS NOTES
[address]. [facility], [address]  [phone]    Chief Complaint: Medication management    Subjective  Wolfgang Huerta is a 78 y.o. WHITE/NON- female , established patient, here for evaluation of the concern(s) above; Here with daughter. Hypothyroidism? Patient would like to get off synthroid. Daughter states that they are unclear why patient continue to take the medication. She was started on this medication several years ago following an elevated TSH (4.55 - 8/22/2019) and has since been taking the medication. HLD:  Pt would also like to stop taking Lipitor. States that she her cholesterol has been well controlled and would like to manage this without medications. Pertinent negatives include  no chest pain, no abdominal pain and no shortness of breath. Allergies - reviewed:    Allergies   Allergen Reactions    Ciprofloxacin Nausea Only       Past Medical History - reviewed:  Past Medical History:   Diagnosis Date    Advance directive discussed with patient 03/23/2016    Dysuria 5/9/16    [facility]  [facility] report rec'd     Encounter for gynecological examination 5/19/16    notes from [address]    CASTILLO (generalized anxiety disorder)     Hearing loss in left ear     Murmur, cardiac     echocardiogram report from Dr Subramanian Deep  EF 65-70%  1/23/17 note and ekg    Myalgia     Pure hypercholesterolemia 11/14/2009    Screening for glaucoma 07/14/2016    Dr Ashly Lentz note rec'd    Unexplained weight loss 6/6/16    GI spec Dr Jessica Chang note rec'd       Depression screening:  3 most recent PHQ Screens 8/19/2022   Little interest or pleasure in doing things Not at all   Feeling down, depressed, irritable, or hopeless Not at all   Total Score PHQ 2 0   Trouble falling or staying asleep, or sleeping too much Not at all   Feeling tired or having little energy Not at all   Poor appetite, weight loss, or overeating Not at all Feeling bad about yourself - or that you are a failure or have let yourself or your family down Not at all   Trouble concentrating on things such as school, work, reading, or watching TV Not at all   Moving or speaking so slowly that other people could have noticed; or the opposite being so fidgety that others notice Not at all   Thoughts of being better off dead, or hurting yourself in some way Not at all   PHQ 9 Score 0   How difficult have these problems made it for you to do your work, take care of your home and get along with others Not difficult at all         Review of systems:  [place]      Physical Exam  Visit Vitals  BP (!) 152/90 (BP 1 Location: Right upper arm, BP Patient Position: Sitting, BP Cuff Size: Adult)   Pulse 95   Temp 96.9 °F (36.1 °C) (Oral)   Resp 16   Ht 5' 6\" (1.676 m)   Wt 113 lb (51.3 kg)   LMP  (LMP Unknown)   SpO2 94%   BMI 18.24 kg/m²       General: Alert and oriented, in no acute distress. Well nourished. SKIN: No rash. No suspicious lesions or moles. EYE: PERRL. Sclera and conjuctival clear. Extraocular movements intact. EARS: External normal, canals clear, tympanic membranes normal.   NOSE: Mucosa healthy without drainage or ulceration. OROPHARYNX: No suspicious lesions, normal dentition, pharynx, tongue and tonsils normal.  NECK: Supple; no masses; thyroid normal.  LYMPH NODES: No significant cervial lymphadenopathy. LUNGS: Respirations unlabored; clear to auscultation bilaterally. CARDIOVASCULAR: Regular, rate, and rhythm without murmurs, gallops or rubs. ABDOMEN: Soft; nontender; nondistended; normoactive bowel sounds; no masses or organomegaly. MUSCULOSKELETAL: FROM in all extremities     EXT: No edema. Neurovascularlly intact. Normal gait. Neurological: Alert and oriented X 3, normal strength and tone. Normal symmetric reflexes. Normal coordination and gait       Assessment/Plan    ICD-10-CM ICD-9-CM    1.  Medicare annual wellness visit, subsequent  Z00.00 V70.0 2. Mixed hyperlipidemia  E78.2 272.2 LIPID PANEL      LIPID PANEL      3. Acquired hypothyroidism  E03.9 244.9 TSH 3RD GENERATION      TSH 3RD GENERATION      4. Abnormal glucose  L59.08 449.59 METABOLIC PANEL, BASIC      METABOLIC PANEL, BASIC        1. Medicare annual wellness visit, subsequent  See separate notes    2. Mixed hyperlipidemia  Will discontinue Lipitor at patient request. Continue to monitor lifestyle  - LIPID PANEL; Future    3. Acquired hypothyroidism  Will discontinue synthroid 25 mcg as well. TSH 3RD GENERATION; Future    4. Abnormal glucose  - METABOLIC PANEL, BASIC; Future    Follow up: 3-6 months or sooner if needed    We discussed the expected course, resolution and complications of the diagnosis(es) in detail. Medication risks, benefits, costs, interactions, and alternatives were discussed as indicated. I advised him to contact the office if his condition worsens, changes or fails to improve as anticipated. He expressed understanding with the diagnosis(es) and plan. Patient understands that this encounter was a temporary measure, and the importance of further follow up and examination was emphasized. Patient verbalized understanding.       Signed By: Hannah Rapp MD     August 19, 2022

## 2022-08-19 NOTE — PROGRESS NOTES
Name and  Verified. Pharmacy verified     Chief Complaint   Patient presents with    Physical    Memory Loss     Noticed with in the last year. Patient fasting, [facility]. 1. Have you been to the ER, urgent care clinic since your last visit? Hospitalized since your last visit? No    2. Have you seen or consulted any other health care providers outside of the [address] [place] [address] since your last visit? Include any pap smears or colon screening.  No    Health Maintenance Due   Topic Date Due    Depression Screen  Never done    Shingrix Vaccine Age 49> (1 of 2) Never done    Pneumococcal 65+ years (1 - PCV) Never done    COVID-19 Vaccine (3 - Booster for Pfizer series) 2021    Lipid Screen  2021    Medicare Yearly Exam  2022

## 2022-08-19 NOTE — PROGRESS NOTES
[address] [facility]  [address]. [place], [facility] [address] [place] [address]  [phone]    Date of visit: 8/19/2022    This is an Subsequent Medicare Annual Wellness Visit (AWV), (Performed more than 12 months after effective date of Medicare Part B enrollment and 12 months after last preventive visit, Once in a lifetime)    I have reviewed the patient's medical history in detail and updated the computerized patient record. Timo Landa is a 78 y.o. female   History obtained from: the patient.     Concerns today   none    History     Patient Active Problem List   Diagnosis Code    Pure hypercholesterolemia E78.00    Anxiety F41.9    Hearing loss in right ear H91.91    DJD (degenerative joint disease) of knee M17.10    Elevated blood pressure reading without diagnosis of hypertension R03.0    Abnormal TSH R79.89     Past Medical History:   Diagnosis Date    Advance directive discussed with patient 03/23/2016    Dysuria 5/9/16    [facility]  [facility] report rec'd     Encounter for gynecological examination 5/19/16    notes from [address]    CASTILLO (generalized anxiety disorder)     Hearing loss in left ear     Murmur, cardiac     echocardiogram report from Dr Bravo Cabrales  EF 65-70%  1/23/17 note and ekg    Myalgia     Pure hypercholesterolemia 11/14/2009    Screening for glaucoma 07/14/2016    Dr Avelino Ramirez note rec'd    Unexplained weight loss 6/6/16    GI spec Dr Vandana Kelley note rec'd      Past Surgical History:   Procedure Laterality Date    ENDOSCOPY, COLON, DIAGNOSTIC  2/2005    ad polyp; 3 year repeat ; Vandana Kelley    ENDOSCOPY, COLON, DIAGNOSTIC  3/2008    neg; 5 year repeat; [facility] TUBAL LIGATION      [place] COLONOSCOPY FLX DX W/COLLJ [address] WHEN PFRMD  11/21/2013; 2008; 2003          Allergies   Allergen Reactions    Ciprofloxacin Nausea Only     Current Outpatient Medications   Medication Sig Dispense Refill    diazePAM (VALIUM) 5 mg tablet TAKE 1 TABLET BY MOUTH EVERY MORNING, ONE-HALF TABLET AT MIDDAY AND 1 TABLET EVERY NIGHT AT BEDTIME 75 Tablet 0    fluticasone propionate (FLONASE) 50 mcg/actuation nasal spray SHAKE LIQUID AND USE 2 SPRAYS IN EACH NOSTRIL DAILY 48 g 3    cholecalciferol (VITAMIN D3) (1000 Units /25 mcg) tablet Take  by mouth daily. aspirin delayed-release 81 mg tablet Take 81 mg by mouth daily. Family History   Problem Relation Age of Onset    Dementia Mother     Other Mother         Vitamin B12 deficiency/Pernicious anemia    Congenital heart defect Father     Cancer Brother         Prostate CA    Stroke Brother     Cancer Daughter         Non-Hodgkin's lymphoma    Breast Cancer Maternal Aunt 54     Social History     Tobacco Use    Smoking status: Former     Packs/day: 0.50     Years: 20.00     Pack years: 10.00     Types: Cigarettes     Quit date: 1973     Years since quittin.7    Smokeless tobacco: Never   Substance Use Topics    Alcohol use: Yes     Comment: Rarely       Specialists/Care Team   Rose Marie Soliman.  Charlene Latham has established care with the following healthcare providers:  Patient Care Team:  Charles Perez MD as PCP - General (Family Medicine)  Cliff Flanagan MD as PCP - [facility] Empaneled Provider  Corky Pineda MD as Physician (Obstetrics & Gynecology)  iBta White MD (Ophthalmology)  Cuong Segal MD (Inactive) as Physician (Gastroenterology)  Rosario Wright DMD (Dental General Practice)  Ann Jules MD (Obstetrics & Gynecology)  Ele Su MD (Cardiovascular Disease Physician)      Depression risk factor screening      3 most recent PHQ Screens 2022   Little interest or pleasure in doing things Not at all   Feeling down, depressed, irritable, or hopeless Several days   Total Score PHQ 2 1   Trouble falling or staying asleep, or sleeping too much -   Feeling tired or having little energy -   Poor appetite, weight loss, or overeating -   Feeling bad about yourself - or that you are a failure or have let yourself or your family down -   Trouble concentrating on things such as school, work, reading, or watching TV -   Moving or speaking so slowly that other people could have noticed; or the opposite being so fidgety that others notice -   Thoughts of being better off dead, or hurting yourself in some way -   PHQ 9 Score -   How difficult have these problems made it for you to do your work, take care of your home and get along with others -         Alcohol Risk Screen    Do you average more than 1 drink per night or more than 7 drinks a week:  No    On any one occasion in the past three months have you have had more than 3 drinks containing alcohol:  No        Functional Ability and Level of Safety:    Hearing: Hearing is good. Activities of Daily Living: The home contains:  safe at home  Patient does total self care      Ambulation: with no difficulty       Fall Risk:  Fall Risk Assessment, last 12 mths 8/19/2022   Able to walk? Yes   Fall in past 12 months? 0   Do you feel unsteady? 0   Are you worried about falling 0      Abuse Screen:  Patient is not abused       Cognitive Screening    Has your family/caregiver stated any concerns about your memory: no     Normal  AAOx4    Health Maintenance Due     Health Maintenance Due   Topic Date Due    Depression Screen  Never done    Shingrix Vaccine Age 49> (1 of 2) Never done    Pneumococcal 65+ years (1 - PCV) Never done    COVID-19 Vaccine (3 - Booster for Pfizer series) 08/11/2021    Lipid Screen  11/19/2021    Medicare Yearly Exam  06/30/2022       Review of Systems (if indicated for problems addressed today)     N/A    Physical Examination     Vitals:    08/19/22 1212 08/19/22 1906   BP: (!) 166/92 (!) 152/90   Pulse: 95    Resp: 16    Temp: 96.9 °F (36.1 °C)    TempSrc: Oral    SpO2: 94%    Weight: 113 lb (51.3 kg)    Height: 5' 6\" (1.676 m)      Body mass index is 18.24 kg/m². No results found.   Was the patient's timed Up & Go test unsteady or longer than 30 seconds? no      Discussion of Advance Directive   Discussed with Yu Mclean. Santi Baldwina her ability to prepare and advance directive in the case that an injury or illness causes her to be unable to make health care decisions. Discussed.  is next of kin. Has ACP doc. Assessment/Plan   Education and counseling provided:  Are appropriate based on today's review and evaluation      ICD-10-CM ICD-9-CM    1. Medicare annual wellness visit, subsequent  Z00.00 V70.0       2. Mixed hyperlipidemia  E78.2 272.2 LIPID PANEL      LIPID PANEL      3. Acquired hypothyroidism  E03.9 244.9 TSH 3RD GENERATION      TSH 3RD GENERATION      4. Abnormal glucose  I91.93 148.43 METABOLIC PANEL, BASIC      METABOLIC PANEL, BASIC            Medicare Wellness, subsequent:  I have discussed with pt the following topics:   - Reviewed diet, exercise and weight control  -Immunizations appropriate for age were discussed with patient and updated   - Recommended sodium restriction   - Reviewed medications and side effects in detail      See separate notes for chronic medical problems. Follow up: 1 year. RTC to clinic sooner if needed    We discussed the expected course, resolution and complications of the diagnosis(es) in detail. Medication risks, benefits, costs, interactions, and alternatives were discussed as indicated. I advised to contact the office if his condition worsens, changes or fails to improve as anticipated. Pt expressed understanding with the diagnosis(es) and plan. Patient understands that this encounter was a temporary measure, and the importance of further follow up and examination was emphasized. Patient verbalized understanding.       Signed By: Brent Vinson MD     August 19, 2022

## 2022-08-20 LAB
ANION GAP SERPL CALC-SCNC: 4 MMOL/L (ref 5–15)
BUN SERPL-MCNC: 7 MG/DL (ref 6–20)
BUN/CREAT SERPL: 11 (ref 12–20)
CALCIUM SERPL-MCNC: 10 MG/DL (ref 8.5–10.1)
CHLORIDE SERPL-SCNC: 100 MMOL/L (ref 97–108)
CHOLEST SERPL-MCNC: 182 MG/DL
CO2 SERPL-SCNC: 31 MMOL/L (ref 21–32)
CREAT SERPL-MCNC: 0.66 MG/DL (ref 0.55–1.02)
GLUCOSE SERPL-MCNC: 100 MG/DL (ref 65–100)
HDLC SERPL-MCNC: 73 MG/DL
HDLC SERPL: 2.5 {RATIO} (ref 0–5)
LDLC SERPL CALC-MCNC: 97.4 MG/DL (ref 0–100)
POTASSIUM SERPL-SCNC: 4.4 MMOL/L (ref 3.5–5.1)
SODIUM SERPL-SCNC: 135 MMOL/L (ref 136–145)
TRIGL SERPL-MCNC: 58 MG/DL (ref ?–150)
TSH SERPL DL<=0.05 MIU/L-ACNC: 1.1 UIU/ML (ref 0.36–3.74)
VLDLC SERPL CALC-MCNC: 11.6 MG/DL

## 2022-11-11 DIAGNOSIS — F41.9 ANXIETY: ICD-10-CM

## 2022-11-11 RX ORDER — DIAZEPAM 5 MG/1
TABLET ORAL
Qty: 75 TABLET | Refills: 1 | Status: SHIPPED | OUTPATIENT
Start: 2022-11-11

## 2023-01-18 DIAGNOSIS — F41.9 ANXIETY: ICD-10-CM

## 2023-01-18 RX ORDER — DIAZEPAM 5 MG/1
TABLET ORAL
Qty: 75 TABLET | Refills: 1 | Status: SHIPPED | OUTPATIENT
Start: 2023-01-18

## 2023-01-18 NOTE — TELEPHONE ENCOUNTER
Last Visit: 8/19/22 with MD Gee Evans  Next Appointment: Ramona Soliman to follow-up in 1 year (8/2023)  Previous Refill Encounter(s): 11/11/22 #75 with 1 refill    Requested Prescriptions     Pending Prescriptions Disp Refills    diazePAM (VALIUM) 5 mg tablet [Pharmacy Med Name: DIAZEPAM 5MG TABLETS] 75 Tablet 1     Sig: TAKE 1 TABET BY MOUTH EVERY MORNING, TAKE 1/2 TABLET BY MOUTH MID DAY AND 1 TABLET BY MOUTH EVERY NIGHT         For Pharmacy Admin Tracking Only    Program: Medication Refill  CPA in place:   Recommendation Provided To:    Intervention Detail: New Rx: 1, reason: Patient Preference  Intervention Accepted By:   Elise Brandt Closed?:   Time Spent (min): 5

## 2023-02-16 ENCOUNTER — OFFICE VISIT (OUTPATIENT)
Dept: URGENT CARE | Age: 83
End: 2023-02-16
Payer: MEDICARE

## 2023-02-16 VITALS
BODY MASS INDEX: 18.51 KG/M2 | SYSTOLIC BLOOD PRESSURE: 170 MMHG | OXYGEN SATURATION: 90 % | TEMPERATURE: 98.1 F | RESPIRATION RATE: 16 BRPM | HEART RATE: 84 BPM | WEIGHT: 114.7 LBS | DIASTOLIC BLOOD PRESSURE: 79 MMHG

## 2023-02-16 DIAGNOSIS — R35.0 FREQUENT URINATION: Primary | ICD-10-CM

## 2023-02-16 LAB
BILIRUB UR QL STRIP: NEGATIVE
GLUCOSE UR-MCNC: NEGATIVE MG/DL
KETONES P FAST UR STRIP-MCNC: NEGATIVE MG/DL
PH UR STRIP: 7 [PH] (ref 4.6–8)
PROT UR QL STRIP: NEGATIVE
SP GR UR STRIP: 1.01 (ref 1–1.03)
UA UROBILINOGEN AMB POC: NORMAL (ref 0.2–1)
URINALYSIS CLARITY POC: NORMAL
URINALYSIS COLOR POC: NORMAL
URINE BLOOD POC: NEGATIVE
URINE LEUKOCYTES POC: NEGATIVE
URINE NITRITES POC: NEGATIVE

## 2023-02-16 PROCEDURE — 99203 OFFICE O/P NEW LOW 30 MIN: CPT | Performed by: NURSE PRACTITIONER

## 2023-02-16 PROCEDURE — G8427 DOCREV CUR MEDS BY ELIG CLIN: HCPCS | Performed by: NURSE PRACTITIONER

## 2023-02-16 PROCEDURE — G8536 NO DOC ELDER MAL SCRN: HCPCS | Performed by: NURSE PRACTITIONER

## 2023-02-16 PROCEDURE — 1101F PT FALLS ASSESS-DOCD LE1/YR: CPT | Performed by: NURSE PRACTITIONER

## 2023-02-16 PROCEDURE — 81003 URINALYSIS AUTO W/O SCOPE: CPT | Performed by: NURSE PRACTITIONER

## 2023-02-16 PROCEDURE — G8420 CALC BMI NORM PARAMETERS: HCPCS | Performed by: NURSE PRACTITIONER

## 2023-02-16 PROCEDURE — G8432 DEP SCR NOT DOC, RNG: HCPCS | Performed by: NURSE PRACTITIONER

## 2023-02-16 PROCEDURE — 1090F PRES/ABSN URINE INCON ASSESS: CPT | Performed by: NURSE PRACTITIONER

## 2023-02-16 PROCEDURE — G8399 PT W/DXA RESULTS DOCUMENT: HCPCS | Performed by: NURSE PRACTITIONER

## 2023-02-16 PROCEDURE — 1123F ACP DISCUSS/DSCN MKR DOCD: CPT | Performed by: NURSE PRACTITIONER

## 2023-02-16 NOTE — PROGRESS NOTES
Here for UTI  Urinary frequency for past 3 days  Symptoms occur intermittently  Denies: fever, chills, n/v, severe abdominal pain, flank pain, blood in urine, inability to push out urine, vaginal discharge        Past Medical History:   Diagnosis Date    Advance directive discussed with patient 2016    Dysuria 16    [facility]  US report rec'd     Encounter for gynecological examination 16    notes from [address]    CASTILLO (generalized anxiety disorder)     Hearing loss in left ear     Murmur, cardiac     echocardiogram report from Dr Ricardo Lyons  EF 65-70%  17 note and ekg    Myalgia     Pure hypercholesterolemia 2009    Screening for glaucoma 2016    Dr Lucy Galindo note rec'd    Unexplained weight loss 16    GI spec Dr Ciara Damon note rec'd        Past Surgical History:   Procedure Laterality Date    ENDOSCOPY, COLON, DIAGNOSTIC  2005    ad polyp; 3 year repeat ; Ciara Damon    ENDOSCOPY, COLON, DIAGNOSTIC  3/2008    neg; 5 year repeat; Maty Plume TUBAL LIGATION      [place] COLONOS[place] FLX DX W/COLLJ [facility] WHEN PFRMD  2013; ;               Family History   Problem Relation Age of Onset    Dementia Mother     Other Mother         Vitamin B12 deficiency/Pernicious anemia    Congenital heart defect Father     Cancer Brother         Prostate CA    Stroke Brother     Cancer Daughter         Non-Hodgkin's lymphoma    Breast Cancer Maternal Aunt 54        Social History     Socioeconomic History    Marital status:      Spouse name: Not on file    Number of children: Not on file    Years of education: Not on file    Highest education level: Not on file   Occupational History    Not on file   Tobacco Use    Smoking status: Former     Packs/day: 0.50     Years: 20.00     Pack years: 10.00     Types: Cigarettes     Quit date: 1973     Years since quittin.2    Smokeless tobacco: Never   Vaping Use    Vaping Use: Never used   Substance and Sexual Activity    Alcohol use: Yes     Comment: Rarely    Drug use: No    Sexual activity: Yes     Partners: Male   Other Topics Concern    Not on file   Social History Narrative    Not on file     Social Determinants of Health     Financial Resource Strain: Not on file   Food Insecurity: Not on file   Transportation Needs: Not on file   Physical Activity: Not on file   Stress: Not on file   Social Connections: Not on file   Intimate Partner Violence: Not on file   Housing Stability: Not on file                ALLERGIES: Ciprofloxacin    Review of Systems   All other systems reviewed and are negative. Vitals:    02/16/23 1504 02/16/23 1507   BP: (!) 192/88 (!) 170/79   Pulse: 84    Resp: 16    Temp: 98.1 °F (36.7 °C)    SpO2: 90%    Weight: 114 lb 11.2 oz (52 kg)        Physical Exam  Constitutional:       General: She is not in acute distress. Appearance: She is not ill-appearing, toxic-appearing or diaphoretic. HENT:      Head: Normocephalic and atraumatic. Eyes:      Extraocular Movements: Extraocular movements intact. Cardiovascular:      Rate and Rhythm: Normal rate and regular rhythm. Pulses: Normal pulses. Heart sounds: Normal heart sounds. No murmur heard. No friction rub. No gallop. Pulmonary:      Effort: Pulmonary effort is normal. No respiratory distress. Breath sounds: Normal breath sounds. No wheezing or rales. Abdominal:      General: There is no distension. Palpations: Abdomen is soft. There is no mass. Tenderness: There is no abdominal tenderness. There is no right CVA tenderness, left CVA tenderness or guarding. Skin:     Coloration: Skin is not pale. Neurological:      Mental Status: She is alert and oriented to person, place, and time. Psychiatric:         Mood and Affect: Mood normal.         Behavior: Behavior normal.         Thought Content:  Thought content normal.       [facility]     Differential Diagnosis; Clinical Impression; Plan:       CLINICAL IMPRESSION:  (R35.0) Frequent urination  (primary encounter diagnosis)    Plan:  UA  WNL  Will send culture to confirm  Will hold off on abx currently given normal UA   Will treat based on any abnormal culture results  Contact clinic for any new, worsening or changes       We have reviewed concerning signs/symptoms, normal vs abnormal progression of medical condition and when to seek immediate medical attention.             Procedures           Results for orders placed or performed in visit on 02/16/23   AMB POC URINALYSIS DIP STICK AUTO W/O MICRO   Result Value Ref Range    Color (UA POC)      Clarity (UA POC)      Glucose (UA POC) Negative Negative    Bilirubin (UA POC) Negative Negative    Ketones (UA POC) Negative Negative    Specific gravity (UA POC) 1.010 1.001 - 1.035    Blood (UA POC) Negative Negative    pH (UA POC) 7.0 4.6 - 8.0    Protein (UA POC) Negative Negative    Urobilinogen (UA POC) 0.2 mg/dL 0.2 - 1    Nitrites (UA POC) Negative Negative    Leukocyte esterase (UA POC) Negative Negative

## 2023-02-18 LAB
BACTERIA SPEC CULT: NORMAL
SERVICE CMNT-IMP: NORMAL

## 2023-04-02 DIAGNOSIS — F41.9 ANXIETY: ICD-10-CM

## 2023-04-03 RX ORDER — DIAZEPAM 5 MG/1
TABLET ORAL
Qty: 30 TABLET | Refills: 0 | Status: SHIPPED | OUTPATIENT
Start: 2023-04-03

## 2023-05-04 DIAGNOSIS — F41.9 ANXIETY: ICD-10-CM

## 2023-05-04 RX ORDER — DIAZEPAM 5 MG/1
TABLET ORAL
Qty: 75 TABLET | Refills: 1 | Status: SHIPPED | OUTPATIENT
Start: 2023-05-04

## 2023-08-15 DIAGNOSIS — F41.9 ANXIETY: Primary | ICD-10-CM

## 2023-08-15 RX ORDER — DIAZEPAM 5 MG/1
TABLET ORAL
Qty: 75 TABLET | Refills: 1 | Status: SHIPPED | OUTPATIENT
Start: 2023-08-15 | End: 2023-10-16

## 2023-08-15 NOTE — TELEPHONE ENCOUNTER
Last appointment: 4/14/23  Next appointment: 9/14/23  Previous refill encounter(s): 5/4/23 #75 with 1 refill    Requested Prescriptions     Pending Prescriptions Disp Refills    diazePAM (VALIUM) 5 MG tablet 75 tablet 1     Sig: TAKE 1 TABET BY MOUTH EVERY MORNING, TAKE 1/2 TABLET BY MOUTH MID DAY AND 1 TABLET BY MOUTH EVERY NIGHT         For Pharmacy Admin Tracking Only    Program: Medication Refill  CPA in place:    Recommendation Provided To:    Intervention Detail: New Rx: 1, reason: Patient Preference  Intervention Accepted By:   Dylan Camacho Closed?:    Time Spent (min): 5

## 2023-09-14 ENCOUNTER — OFFICE VISIT (OUTPATIENT)
Age: 83
End: 2023-09-14

## 2023-09-14 VITALS
HEART RATE: 83 BPM | OXYGEN SATURATION: 95 % | BODY MASS INDEX: 18.4 KG/M2 | DIASTOLIC BLOOD PRESSURE: 83 MMHG | TEMPERATURE: 98 F | SYSTOLIC BLOOD PRESSURE: 148 MMHG | RESPIRATION RATE: 16 BRPM | HEIGHT: 62 IN | WEIGHT: 100 LBS

## 2023-09-14 DIAGNOSIS — Z00.00 MEDICARE ANNUAL WELLNESS VISIT, SUBSEQUENT: Primary | ICD-10-CM

## 2023-09-14 DIAGNOSIS — F41.9 ANXIETY: ICD-10-CM

## 2023-09-14 PROCEDURE — G0439 PPPS, SUBSEQ VISIT: HCPCS | Performed by: STUDENT IN AN ORGANIZED HEALTH CARE EDUCATION/TRAINING PROGRAM

## 2023-09-14 PROCEDURE — 1123F ACP DISCUSS/DSCN MKR DOCD: CPT | Performed by: STUDENT IN AN ORGANIZED HEALTH CARE EDUCATION/TRAINING PROGRAM

## 2023-09-14 RX ORDER — DIAZEPAM 5 MG/1
TABLET ORAL
Qty: 75 TABLET | Refills: 2 | Status: SHIPPED | OUTPATIENT
Start: 2023-10-14 | End: 2023-11-15

## 2023-09-14 SDOH — ECONOMIC STABILITY: FOOD INSECURITY: WITHIN THE PAST 12 MONTHS, THE FOOD YOU BOUGHT JUST DIDN'T LAST AND YOU DIDN'T HAVE MONEY TO GET MORE.: NEVER TRUE

## 2023-09-14 SDOH — ECONOMIC STABILITY: HOUSING INSECURITY
IN THE LAST 12 MONTHS, WAS THERE A TIME WHEN YOU DID NOT HAVE A STEADY PLACE TO SLEEP OR SLEPT IN A SHELTER (INCLUDING NOW)?: NO

## 2023-09-14 SDOH — ECONOMIC STABILITY: FOOD INSECURITY: WITHIN THE PAST 12 MONTHS, YOU WORRIED THAT YOUR FOOD WOULD RUN OUT BEFORE YOU GOT MONEY TO BUY MORE.: NEVER TRUE

## 2023-09-14 SDOH — ECONOMIC STABILITY: INCOME INSECURITY: HOW HARD IS IT FOR YOU TO PAY FOR THE VERY BASICS LIKE FOOD, HOUSING, MEDICAL CARE, AND HEATING?: NOT HARD AT ALL

## 2023-09-14 ASSESSMENT — PATIENT HEALTH QUESTIONNAIRE - PHQ9
SUM OF ALL RESPONSES TO PHQ QUESTIONS 1-9: 0
1. LITTLE INTEREST OR PLEASURE IN DOING THINGS: 0
SUM OF ALL RESPONSES TO PHQ QUESTIONS 1-9: 0
SUM OF ALL RESPONSES TO PHQ9 QUESTIONS 1 & 2: 0
2. FEELING DOWN, DEPRESSED OR HOPELESS: 0

## 2023-09-14 NOTE — PROGRESS NOTES
[address]  [address]. [place], [address] [facility]  [phone]    Date of visit: 9/14/2023    This is an Subsequent Medicare Annual Wellness Visit (AWV), (Performed more than 12 months after effective date of Medicare Part B enrollment and 12 months after last preventive visit, Once in a lifetime)    I have reviewed the patient's medical history in detail and updated the computerized patient record. Aminta Boo is a 78 y.o. female   History obtained from: the patient.     Concerns today   (Patient understands that medical problems addressed today may incur additional cost as this is a preventive visit)  -see separate notes    History     Patient Active Problem List   Diagnosis    Abnormal TSH    DJD (degenerative joint disease) of knee    Elevated blood pressure reading without diagnosis of hypertension    Hearing loss in right ear    Pure hypercholesterolemia    Anxiety     Past Medical History:   Diagnosis Date    Advance directive discussed with patient 03/23/2016    Dysuria 5/9/16    [facility]  [facility] report rec'd     Encounter for gynecological examination 5/19/16    notes from [address] [place] [address]    BOOM (generalized anxiety disorder)     Hearing loss in left ear     Murmur, cardiac     echocardiogram report from Dr Dallis Mcburney  EF 65-70%  1/23/17 note and ekg    Myalgia     Pure hypercholesterolemia 11/14/2009    Screening for glaucoma 07/14/2016    Dr Micki Nieto note rec'd    Unexplained weight loss 6/6/16    GI spec Dr Alex Lerner note rec'd      Past Surgical History:   Procedure Laterality Date    COLONOSCOPY  2/2005    ad polyp; 3 year repeat ; Alex Lerner    COLONOSCOPY  3/2008    neg; 5 year repeat; Elsa    COLONOSCOPY FLX DX W/COLLJ SPEC WHEN PFRMD  11/21/2013; 2008; 2003         TUBAL LIGATION       No Known Allergies  Current Outpatient Medications   Medication Sig Dispense Refill    [START ON 10/14/2023] diazePAM (VALIUM) 5 MG tablet TAKE 1 TABET BY MOUTH EVERY

## 2023-09-14 NOTE — PROGRESS NOTES
Name and Date of Birth Verified    Accompanied by: daughter    Pharmacy Verified    Chief Complaint   Patient presents with    Follow-up     4/14/2023 Anxiety       1. \"Have you been to the ER, urgent care clinic since your last visit? Hospitalized since your last visit? \" No    2. \"Have you seen or consulted any other health care providers outside of the [address] [place] [address] since your last visit? \" No     3. For patients aged 43-73: Has the patient had a colonoscopy / FIT/ Cologuard? Discontinued      If the patient is female:    4. For patients aged 43-66: Has the patient had a mammogram within the past 2 years? Discontinued      5. For patients aged 21-65: Has the patient had a pap smear?  Discontinued     Health Maintenance Due   Topic Date Due    DTaP/Tdap/Td vaccine (1 - Tdap) Never done    Shingles vaccine (1 of 2) Never done    Pneumococcal 65+ years Vaccine (1 - PCV) Never done    COVID-19 Vaccine (3 - Pfizer series) 05/06/2021    Flu vaccine (1) Never done    Depression Screen  08/19/2023    Annual Wellness Visit (AWV)  08/20/2023

## 2023-09-14 NOTE — PATIENT INSTRUCTIONS
on Aging online. You need 5139-7795 mg of calcium and 1755-8723 IU of vitamin D per day. It is possible to meet your calcium requirement with diet alone, but a vitamin D supplement is usually necessary to meet this goal.  When exposed to the sun, use a sunscreen that protects against both UVA and UVB radiation with an SPF of 30 or greater. Reapply every 2 to 3 hours or after sweating, drying off with a towel, or swimming. Always wear a seat belt when traveling in a car. Always wear a helmet when riding a bicycle or motorcycle.

## 2023-09-20 ENCOUNTER — TELEPHONE (OUTPATIENT)
Age: 83
End: 2023-09-20

## 2023-09-20 NOTE — TELEPHONE ENCOUNTER
----- Message from Scarlett Ayala sent at 9/8/2023  4:29 PM EDT -----  Subject: Message to Provider    QUESTIONS  Information for Provider? Roseann was calling to speak to the office   [phone]  ---------------------------------------------------------------------------  --------------  [address]  [phone]; Do not leave any message, patient will call back for answer  ---------------------------------------------------------------------------  --------------  SCRIPT ANSWERS  Relationship to Patient? Covered Entity  Covered Entity Type? Pharmacy? Representative Name?  Roseann

## 2023-11-27 ENCOUNTER — TELEPHONE (OUTPATIENT)
Age: 83
End: 2023-11-27

## 2023-11-27 DIAGNOSIS — F41.9 ANXIETY: ICD-10-CM

## 2023-11-27 RX ORDER — DIAZEPAM 5 MG/1
TABLET ORAL
Qty: 75 TABLET | Refills: 2 | Status: SHIPPED | OUTPATIENT
Start: 2023-11-27 | End: 2023-11-30 | Stop reason: SDUPTHER

## 2023-11-27 NOTE — TELEPHONE ENCOUNTER
Patient requesting an early refill  on Diazepam, patient out of medication.  A new prescription is needed with how patient is actually taking Diazepam. Send to [address] #[id number]

## 2023-11-27 NOTE — TELEPHONE ENCOUNTER
Patient daughter Perlita Florence would like to get a RX refill diazePAM (VALIUM) 5 MG tablet please fill today she can be reached @ [phone]

## 2023-11-28 ENCOUNTER — TELEPHONE (OUTPATIENT)
Age: 83
End: 2023-11-28

## 2023-11-28 NOTE — TELEPHONE ENCOUNTER
Returned call to pharmacy,  stated pt is requesting to refill her diazepam today.   Unable to fill until Dec 6,  advised per Dr Beti Ferrera to fill on Dec 6 (2 days early)

## 2023-11-28 NOTE — TELEPHONE ENCOUNTER
Patient's daughter Christopher Perez [phone] requesting a call back from Dr. Michelle Lepe about the Valium prescription. Naseem Landon was told that the medication could not be filled until Dec 6.

## 2023-11-28 NOTE — TELEPHONE ENCOUNTER
Pharmacy requesting a call back to get permission from Dr. Alyx Conrad to fill the Diazepam prescription early. Next refill should be Dec,6 and that is 2 days early.  [place] [phone]

## 2023-11-30 DIAGNOSIS — F41.9 ANXIETY: ICD-10-CM

## 2023-11-30 RX ORDER — DIAZEPAM 5 MG/1
TABLET ORAL
Qty: 30 TABLET | Refills: 0 | Status: SHIPPED | OUTPATIENT
Start: 2023-12-01 | End: 2024-01-01

## 2023-11-30 NOTE — TELEPHONE ENCOUNTER
Explained Per Dr. Obdulia Kirkpatrick that refill can not be ordered early due to amount given on last refill patient should still have pills left. Daughter would like to know what to so if Mom has withdrawals. Stated mom has memory loss and may have forgotten she has taken her Valium which may have lead to her taking more than prescribed. Per Dr. Obdulia Kirkpatrick route call to him to advised.

## 2024-03-21 ENCOUNTER — OFFICE VISIT (OUTPATIENT)
Age: 84
End: 2024-03-21
Payer: COMMERCIAL

## 2024-03-21 VITALS
SYSTOLIC BLOOD PRESSURE: 137 MMHG | TEMPERATURE: 98.3 F | WEIGHT: 94.14 LBS | BODY MASS INDEX: 17.32 KG/M2 | HEART RATE: 73 BPM | HEIGHT: 62 IN | RESPIRATION RATE: 16 BRPM | OXYGEN SATURATION: 95 % | DIASTOLIC BLOOD PRESSURE: 77 MMHG

## 2024-03-21 DIAGNOSIS — R39.15 URINARY URGENCY: ICD-10-CM

## 2024-03-21 DIAGNOSIS — F41.9 ANXIETY: Primary | ICD-10-CM

## 2024-03-21 LAB
BILIRUBIN, URINE, POC: NEGATIVE
BLOOD URINE, POC: NEGATIVE
GLUCOSE URINE, POC: NEGATIVE
KETONES, URINE, POC: NEGATIVE
LEUKOCYTE ESTERASE, URINE, POC: NORMAL
NITRITE, URINE, POC: NEGATIVE
PH, URINE, POC: 7 (ref 4.6–8)
PROTEIN,URINE, POC: NEGATIVE
SPECIFIC GRAVITY, URINE, POC: 1.01 (ref 1–1.03)
URINALYSIS CLARITY, POC: CLEAR
URINALYSIS COLOR, POC: NORMAL
UROBILINOGEN, POC: NORMAL

## 2024-03-21 PROCEDURE — 81003 URINALYSIS AUTO W/O SCOPE: CPT | Performed by: STUDENT IN AN ORGANIZED HEALTH CARE EDUCATION/TRAINING PROGRAM

## 2024-03-21 PROCEDURE — 99213 OFFICE O/P EST LOW 20 MIN: CPT | Performed by: STUDENT IN AN ORGANIZED HEALTH CARE EDUCATION/TRAINING PROGRAM

## 2024-03-21 PROCEDURE — 1123F ACP DISCUSS/DSCN MKR DOCD: CPT | Performed by: STUDENT IN AN ORGANIZED HEALTH CARE EDUCATION/TRAINING PROGRAM

## 2024-03-21 RX ORDER — DIAZEPAM 5 MG/1
5 TABLET ORAL EVERY 8 HOURS PRN
COMMUNITY
Start: 2024-02-28 | End: 2024-03-21 | Stop reason: SDUPTHER

## 2024-03-21 RX ORDER — DIAZEPAM 5 MG/1
TABLET ORAL
Qty: 75 TABLET | Refills: 0 | Status: SHIPPED | OUTPATIENT
Start: 2024-03-21 | End: 2025-03-05

## 2024-03-21 NOTE — PROGRESS NOTES
Chief Complaint   Patient presents with    Follow-up     6 Months Anxiety    Medication Refill     Accompanied: By Daughter    \"Have you been to the ER, urgent care clinic since your last visit?  Hospitalized since your last visit?\"    NO    “Have you seen or consulted any other health care providers outside of [facility] since your last visit?”    NO             Vitals:    24 1514   BP: 137/77   Pulse: 73   Resp: 16   Temp: 98.3 °F (36.8 °C)   SpO2: 95%     Health Maintenance Due   Topic Date Due    DTaP/Tdap/Td vaccine (1 - Tdap) Never done    Shingles vaccine (1 of 2) Never done    Respiratory Syncytial Virus (RSV) Pregnant or age 60 yrs+ (1 - 1-dose 60+ series) Never done    Pneumococcal 65+ years Vaccine (1 of 1 - PCV) Never done      The patient, Sonia RAMOS Haro, identity was verified by name and , pharmacy verified  Labs:Yes  Fasting:No         
tympanic membranes normal.   NOSE: Mucosa healthy without drainage or ulceration.  OROPHARYNX: No suspicious lesions, normal dentition, pharynx, tongue and tonsils normal.  NECK: Supple; no masses; thyroid normal.  LYMPH NODES: No significant cervial lymphadenopathy.  LUNGS: Respirations unlabored; clear to auscultation bilaterally.  CARDIOVASCULAR: Regular, rate, and rhythm without murmurs, gallops or rubs.  ABDOMEN: Soft; nontender; nondistended; normoactive bowel sounds; no masses or organomegaly.  MUSCULOSKELETAL: FROM in all extremities     EXT: No edema. Neurovascularlly intact. Normal gait.  Neurological: Alert and oriented X 3, normal strength and tone. Normal symmetric reflexes. Normal coordination and gait     Assessment/Plan   Diagnosis Orders   1. Anxiety  diazePAM (VALIUM) 5 MG tablet      2. Urinary urgency  AMB POC URINALYSIS DIP STICK AUTO W/O MICRO    Culture, Urine      1. Anxiety  - diazePAM (VALIUM) 5 MG tablet; TAKE 1 TABET IN THE  MORNING, TAKE 1/2 TABLET BY MOUTH MID DAY AND 1 TABLET AT BEDTIME   -discussed risks/benefits/precautions/side effects of medication with the patient      2. Urinary urgency    - AMB POC URINALYSIS - 1+ LE, otherwise negative. Will hold on to abx at this time until culture results as pt has no other symptoms and this seem to be chronic.  - Culture, Urine; Future      Follow up: 6 months. RTC to clinic sooner should symptoms persist, worsen or fail to improve as anticipated.    We discussed the expected course, resolution and complications of the diagnosis(es) in detail.  Medication risks, benefits, costs, interactions, and alternatives were discussed as indicated.  I advised to contact the office if his condition worsens, changes or fails to improve as anticipated. Pt expressed understanding with the diagnosis(es) and plan. Patient understands that this encounter was a temporary measure, and the importance of further follow up and examination was emphasized.  Patient

## 2024-03-23 LAB
BACTERIA SPEC CULT: NORMAL
CC UR VC: NORMAL
SERVICE CMNT-IMP: NORMAL

## 2024-04-18 DIAGNOSIS — F41.9 ANXIETY: ICD-10-CM

## 2024-04-19 RX ORDER — DIAZEPAM 5 MG/1
TABLET ORAL
Qty: 75 TABLET | Refills: 0 | Status: SHIPPED | OUTPATIENT
Start: 2024-04-19 | End: 2024-05-19

## 2024-04-19 NOTE — TELEPHONE ENCOUNTER
Last appointment: 3/21/24  Next appointment: 9/26/24  Previous refill encounter(s): 3/21/24 #75    Requested Prescriptions     Pending Prescriptions Disp Refills    diazePAM (VALIUM) 5 MG tablet [Pharmacy Med Name: DIAZEPAM 5MG TABLETS] 75 tablet 0     Sig: TAKE 1 TABLET BY MOUTH EVERY MORNING, TAKE 1/2 TABLET MID DAY AND TAKE 1 TABLET AT BEDTIME         For Pharmacy Admin Tracking Only    Program: Medication Refill  CPA in place:    Recommendation Provided To:   Intervention Detail: New Rx: 1, reason: Patient Preference  Intervention Accepted By:   Gap Closed?:    Time Spent (min): 5

## 2024-04-23 ENCOUNTER — TELEPHONE (OUTPATIENT)
Age: 84
End: 2024-04-23

## 2025-03-07 ENCOUNTER — OFFICE VISIT (OUTPATIENT)
Age: 85
End: 2025-03-07

## 2025-03-07 VITALS
RESPIRATION RATE: 16 BRPM | SYSTOLIC BLOOD PRESSURE: 113 MMHG | BODY MASS INDEX: 17.34 KG/M2 | TEMPERATURE: 99.7 F | WEIGHT: 93.3 LBS | DIASTOLIC BLOOD PRESSURE: 69 MMHG | HEART RATE: 98 BPM | OXYGEN SATURATION: 96 %

## 2025-03-07 DIAGNOSIS — B96.89 ACUTE BACTERIAL BRONCHITIS: Primary | ICD-10-CM

## 2025-03-07 DIAGNOSIS — R09.81 SINUS CONGESTION: ICD-10-CM

## 2025-03-07 DIAGNOSIS — R05.1 ACUTE COUGH: ICD-10-CM

## 2025-03-07 DIAGNOSIS — J20.8 ACUTE BACTERIAL BRONCHITIS: Primary | ICD-10-CM

## 2025-03-07 LAB
Lab: NORMAL
PERFORMING INSTRUMENT: NORMAL
QC PASS/FAIL: NORMAL
SARS-COV-2, POC: NORMAL

## 2025-03-07 RX ORDER — LEVOFLOXACIN 750 MG/1
750 TABLET, FILM COATED ORAL DAILY
Qty: 7 TABLET | Refills: 0 | Status: SHIPPED | OUTPATIENT
Start: 2025-03-07 | End: 2025-03-14

## 2025-03-07 RX ORDER — DIAZEPAM 5 MG/1
5 TABLET ORAL 3 TIMES DAILY PRN
COMMUNITY

## 2025-03-07 RX ORDER — BENZONATATE 100 MG/1
100-200 CAPSULE ORAL 3 TIMES DAILY PRN
Qty: 60 CAPSULE | Refills: 0 | Status: SHIPPED | OUTPATIENT
Start: 2025-03-07 | End: 2025-03-14

## 2025-03-07 RX ORDER — GUAIFENESIN 600 MG/1
600 TABLET, EXTENDED RELEASE ORAL 2 TIMES DAILY
Qty: 30 TABLET | Refills: 0 | Status: SHIPPED | OUTPATIENT
Start: 2025-03-07 | End: 2025-03-22

## 2025-03-07 RX ORDER — BUSPIRONE HYDROCHLORIDE 10 MG/1
10 TABLET ORAL 2 TIMES DAILY
COMMUNITY
Start: 2025-02-11

## 2025-03-07 RX ORDER — ERGOCALCIFEROL 1.25 MG/1
50000 CAPSULE, LIQUID FILLED ORAL WEEKLY
COMMUNITY
Start: 2024-12-10

## 2025-03-07 RX ORDER — PRAMIPEXOLE DIHYDROCHLORIDE 0.25 MG/1
TABLET ORAL
COMMUNITY
Start: 2025-02-20

## 2025-03-07 RX ORDER — DONEPEZIL HYDROCHLORIDE 5 MG/1
5 TABLET, FILM COATED ORAL NIGHTLY
COMMUNITY
Start: 2025-02-23

## 2025-03-07 NOTE — PROGRESS NOTES
Sonia Haro (:  1940) is a 85 y.o. female,New patient, here for evaluation of the following chief complaint(s):  Cold Symptoms (Cough, Fever, sinus congestion x 5 days)       Chest ray back showing:  Findings could relate to a viral process. Subtle increased lung markings at the right lung base could relate to vascularity or atelectasis. Subtle pneumonia is difficult to exclude in the right clinical setting    I called and left a voicemail for the patient's daughter per the patient request.  She is already been prescribed levofloxacin, guaifenesin, Tessalon Perles.  There are no changes to the plan to take these medications.  The levofloxacin should cover pneumonia.  If she gets worse she needs to go to the emergency department or if she develops any concerning signs or symptoms such as shortness of breath, fever, chills or others    Assessment & Plan :  Visit Diagnoses and Associated Orders       Acute cough    -  Primary    XR CHEST PA/LAT [61276 CPT(R)]   - Future Order         Sinus congestion        POCT COVID-19, Antigen [GJE522 Custom]           ORDERS WITHOUT AN ASSOCIATED DIAGNOSIS    busPIRone (BUSPAR) 10 MG tablet [9323]      diazePAM (VALIUM) 5 MG tablet [3965]      donepezil (ARICEPT) 5 MG tablet [45845]      vitamin D (ERGOCALCIFEROL) 1.25 MG (49179 UT) CAPS capsule [422068]      pramipexole (MIRAPEX) 0.25 MG tablet [92897]              Patient with low-grade fever and wheezing in her right middle lobe possibly fine crackles right lower lobe.  She is within the timeframe to take Paxlovid if COVID test is positive so we will test for COVID.  She is outside of the treatment window for influenza so will not test her at this time.  We will also obtain a chest x-ray to rule out pneumonia    Guaifenesin and Tessalon Perles as needed for cough    Given age and clinical presentation is reasonable to treat her with an antibiotic at this point.  I am concerned about her being able to take 2 different

## 2025-03-07 NOTE — PATIENT INSTRUCTIONS
Patient with low-grade fever and wheezing in her right middle lobe possibly fine crackles right lower lobe.  She is within the timeframe to take Paxlovid if COVID test is positive so we will test for COVID.  She is outside of the treatment window for influenza so will not test her at this time.  We will also obtain a chest x-ray to rule out pneumonia    Guaifenesin and Tessalon Perles as needed for cough    Given age and clinical presentation is reasonable to treat her with an antibiotic at this point.  I am concerned about her being able to take 2 different antibiotics at the same time.  Will go ahead and treat her with levofloxacin 750 mg 1 pill daily for 7 days.  She does not need renal dosing as her kidney function is quite good for her age